# Patient Record
Sex: MALE | Race: WHITE | NOT HISPANIC OR LATINO | Employment: OTHER | URBAN - METROPOLITAN AREA
[De-identification: names, ages, dates, MRNs, and addresses within clinical notes are randomized per-mention and may not be internally consistent; named-entity substitution may affect disease eponyms.]

---

## 2017-02-24 ENCOUNTER — OFFICE VISIT (OUTPATIENT)
Dept: FAMILY MEDICINE | Facility: CLINIC | Age: 67
End: 2017-02-24

## 2017-02-24 VITALS
HEIGHT: 68 IN | WEIGHT: 258.6 LBS | DIASTOLIC BLOOD PRESSURE: 83 MMHG | BODY MASS INDEX: 39.19 KG/M2 | HEART RATE: 55 BPM | TEMPERATURE: 97.6 F | SYSTOLIC BLOOD PRESSURE: 159 MMHG | OXYGEN SATURATION: 99 %

## 2017-02-24 DIAGNOSIS — R03.0 ELEVATED BLOOD-PRESSURE READING WITHOUT DIAGNOSIS OF HYPERTENSION: ICD-10-CM

## 2017-02-24 DIAGNOSIS — E66.01 MORBID OBESITY DUE TO EXCESS CALORIES (H): ICD-10-CM

## 2017-02-24 DIAGNOSIS — E11.9 TYPE 2 DIABETES MELLITUS WITHOUT COMPLICATION, WITHOUT LONG-TERM CURRENT USE OF INSULIN (H): Primary | ICD-10-CM

## 2017-02-24 LAB
CREAT UR-MCNC: 73.8 MG/DL
HBA1C MFR BLD: 6.7 % (ref 4.1–5.7)
MICROALBUMIN UR-MCNC: <0.5 MG/DL (ref 0–1.99)
MICROALBUMIN/CREAT UR: NORMAL MG/G

## 2017-02-24 NOTE — MR AVS SNAPSHOT
"              After Visit Summary   2/24/2017    Alejandro Crow    MRN: [id number]           Patient Information     Date Of Birth          1950        Visit Information        Provider Department      2/24/2017 3:40 PM Deshawn Dowell MD [facility]        Today's Diagnoses     Type 2 diabetes mellitus without complication, without long-term current use of insulin (H)    -  1    Morbid obesity due to excess calories (H)        Elevated blood-pressure reading without diagnosis of hypertension           Follow-ups after your visit        Who to contact     Please call your clinic at [phone] to:    Ask questions about your health    Make or cancel appointments    Discuss your medicines    Learn about your test results    Speak to your doctor   If you have compliments or concerns about an experience at your clinic, or if you wish to file a complaint, please contact [facility] Patient Relations at [phone] or email us at [email or web address]         Additional Information About Your Visit        Care EveryWhere ID     This is your Care EveryWhere ID. This could be used by other organizations to access your [place] medical records  [id number]        Your Vitals Were     Pulse Temperature Height Pulse Oximetry BMI (Body Mass Index)       55 97.6  F (36.4  C) (Oral) 5' 7.75\" (172.1 cm) 99% 39.61 kg/m2        Blood Pressure from Last 3 Encounters:   02/24/17 159/83   10/06/16 127/76   07/19/16 128/89    Weight from Last 3 Encounters:   02/24/17 258 lb 9.6 oz (117.3 kg)   10/06/16 243 lb (110.2 kg)   07/19/16 241 lb 9.6 oz (109.6 kg)              We Performed the Following     Hemoglobin A1c ([facility])     Microalbumin Random Ur ([facility])        Primary Care Provider Office Phone # Fax #    Deshawn Dowell [phone]       [facility] [address] [place] [address]        Thank you!     Thank you for choosing " [facility]  for your care. Our goal is always to provide you with excellent care. Hearing back from our patients is one way we can continue to improve our services. Please take a few minutes to complete the written survey that you may receive in the mail after your visit with us. Thank you!             Your Updated Medication List - Protect others around you: Learn how to safely use, store and throw away your medicines at www.disposemymeds.org.          This list is accurate as of: 2/24/17 11:59 PM.  Always use your most recent med list.                   Brand Name Dispense Instructions for use    aspirin 81 MG EC tablet     90 tablet    Take 1 tablet (81 mg) by mouth daily       atorvastatin 80 MG tablet    LIPITOR    90 tablet    Take 1 tablet (80 mg) by mouth daily       blood glucose lancets standard    no brand specified    1 Box    Use to test blood sugar 4 times daily or as directed.       blood glucose monitoring lancets     1 Box    Use to test blood sugars 2-3 times daily or as directed.       blood glucose monitoring meter device kit    no brand specified    1 kit    Use to test blood sugar 4 times daily or as directed.       blood glucose monitoring test strip    no brand specified    2 Box    Use to test blood sugars 4 times daily or as directed       lisinopril 5 MG tablet    PRINIVIL/ZESTRIL    90 tablet    Take 1 tablet (5 mg) by mouth daily       metFORMIN 500 MG 24 hr tablet    GLUCOPHAGE-XR    360 tablet    Take 2 tablets (1,000 mg) by mouth 2 times daily (with meals)       OMEGA-3 FISH OIL PO          VITAMIN C PO      Take 2,000 mg by mouth       VITAMIN D3 PO      Take 4,000 Int'l Units by mouth daily

## 2017-02-24 NOTE — PROGRESS NOTES
"[facility]        Subjective   HPI: Alejandro Crow is a 66 year old  male with history of diabetes type 2, psoriasis, pulmonary embolism who presents as an established patient for the followin) Diabetes   Current Meds: metformin 1g BID   Fasting glucose: 100-135    Hypoglycemic symptoms: none    Exercise: 3-4 x week for 45 min   Concerns: none  2) psoriasis - seeing dermatology for this    ROS: constitutional, cardiovascular, respiratory, GI, , MSK systems reviewed and negative except as noted above.    PMH:  Patient Active Problem List   Diagnosis     Dyslipidemia     Psoriasis     Type 2 diabetes mellitus without complication, without long-term current use of insulin (H)     Skin lesion of face      Current Outpatient Prescriptions   Medication     atorvastatin (LIPITOR) 80 MG tablet     metFORMIN (GLUCOPHAGE-XR) 500 MG 24 hr tablet     blood glucose monitoring (ONE TOUCH DELICA) lancets     lisinopril (PRINIVIL,ZESTRIL) 5 MG tablet     blood glucose monitoring (NO BRAND SPECIFIED) meter device kit     blood glucose monitoring (NO BRAND SPECIFIED) test strip     blood glucose (NO BRAND SPECIFIED) lancets standard     Cholecalciferol (VITAMIN D3 PO)     Ascorbic Acid (VITAMIN C PO)     Omega-3 Fatty Acids (OMEGA-3 FISH OIL PO)     aspirin 81 MG EC tablet     No current facility-administered medications for this visit.       ALLERGIES: Review of patient's allergies indicates no known allergies.  Social: Denies tobacco, alcohol and illicit drug use. Works for [employer]. Caught two 17 in [employer] in the [employer] this year - very excited about it.          Objective   /83 (BP Location: Right arm, Patient Position: Chair, Cuff Size: Adult Large)  Pulse 55  Temp 97.6  F (36.4  C) (Oral)  Ht 5' 7.75\" (172.1 cm)  Wt 258 lb 9.6 oz (117.3 kg)  SpO2 99%  BMI 39.61 kg/m2 Body mass index is 39.61 kg/(m^2).  Gen: healthy, alert and no distress,  HEENT: no adenopathy, no asymmetry, masses, or " scars and thyroid normal to palpation  Pulm: lungs clear to auscultation - no rales, rhonchi or wheezes  CV: regular rate and rhythm,  and no murmur, click,  rub or gallop  Abd: soft, obese, nontender, without hepatosplenomegaly or masses  Back: nontender to palpation  Neuro: Normal strength and tone, sensory exam grossly normal, mentation appears intact and speech normal  Psych: mood and affect normal/bright    A1c 6.7         Assessment & Plan     1. Type 2 diabetes mellitus without complication, without long-term current use of insulin (H)  Remains well controlled on metformin ER 1000mg BID. On lisinopril 5, ASA 81. Goal A1c <7 but is new diagnosis so could make case for 6.5%. Continue current meds for now.  - Hemoglobin A1c ([facility])  - Microalbumin Random Ur ([facility])    2. Morbid obesity due to excess calories (H)  Discussed he is exercising more but that this doesn't give the liberty to eat more. Need to cut portions in half ideally.    3. Elevated blood-pressure reading without diagnosis of hypertension  BP has ran 120s-low 150s systolic, but increased today. He will obtain measurements at the  clinic 2-3x/per week and let us know. If elevated, would increase lisinopril.    F/u in 2 months for weight check.    Precepted today with: Paris Dowell MD

## 2017-02-26 PROBLEM — E66.01 MORBID OBESITY DUE TO EXCESS CALORIES (H): Status: ACTIVE | Noted: 2017-02-26

## 2017-02-26 PROBLEM — R03.0 ELEVATED BLOOD-PRESSURE READING WITHOUT DIAGNOSIS OF HYPERTENSION: Status: ACTIVE | Noted: 2017-02-26

## 2017-02-27 NOTE — PROGRESS NOTES
Preceptor Attestation:  Patient's case reviewed and discussed with Deshawn Dowell MD resident and I evaluated the patient. I agree with written assessment and plan of care.  Supervising Physician:  SARABJIT HAYDEN MD  [facility]

## 2017-04-18 DIAGNOSIS — E11.9 TYPE 2 DIABETES MELLITUS WITHOUT COMPLICATION, WITHOUT LONG-TERM CURRENT USE OF INSULIN (H): ICD-10-CM

## 2017-04-18 RX ORDER — METFORMIN HCL 500 MG
1000 TABLET, EXTENDED RELEASE 24 HR ORAL 2 TIMES DAILY WITH MEALS
Qty: 360 TABLET | Refills: 3 | Status: SHIPPED | OUTPATIENT
Start: 2017-04-18 | End: 2018-05-15

## 2017-08-08 DIAGNOSIS — E11.9 TYPE 2 DIABETES MELLITUS WITHOUT COMPLICATION (H): ICD-10-CM

## 2017-08-08 DIAGNOSIS — I10 BENIGN ESSENTIAL HYPERTENSION: Primary | ICD-10-CM

## 2017-08-11 RX ORDER — LISINOPRIL 5 MG/1
5 TABLET ORAL DAILY
Qty: 90 TABLET | Refills: 3 | Status: SHIPPED | OUTPATIENT
Start: 2017-08-11 | End: 2017-11-24

## 2017-11-10 ENCOUNTER — RECORDS - HEALTHEAST (OUTPATIENT)
Dept: ADMINISTRATIVE | Facility: OTHER | Age: 67
End: 2017-11-10

## 2017-11-10 ENCOUNTER — OFFICE VISIT (OUTPATIENT)
Dept: FAMILY MEDICINE | Facility: CLINIC | Age: 67
End: 2017-11-10

## 2017-11-10 VITALS
RESPIRATION RATE: 20 BRPM | DIASTOLIC BLOOD PRESSURE: 92 MMHG | HEART RATE: 59 BPM | HEIGHT: 68 IN | BODY MASS INDEX: 39.68 KG/M2 | OXYGEN SATURATION: 100 % | SYSTOLIC BLOOD PRESSURE: 162 MMHG | WEIGHT: 261.8 LBS | TEMPERATURE: 98.1 F

## 2017-11-10 DIAGNOSIS — M25.562 ACUTE PAIN OF LEFT KNEE: Primary | ICD-10-CM

## 2017-11-10 DIAGNOSIS — E11.9 TYPE 2 DIABETES MELLITUS WITHOUT COMPLICATION, WITHOUT LONG-TERM CURRENT USE OF INSULIN (H): ICD-10-CM

## 2017-11-10 DIAGNOSIS — R03.0 ELEVATED BLOOD-PRESSURE READING WITHOUT DIAGNOSIS OF HYPERTENSION: ICD-10-CM

## 2017-11-10 LAB
BUN SERPL-MCNC: 13 MG/DL (ref 7–30)
CALCIUM SERPL-MCNC: 9.4 MG/DL (ref 8.5–10.4)
CHLORIDE SERPLBLD-SCNC: 101 MMOL/L (ref 94–109)
CHOLEST SERPL-MCNC: 108 MG/DL
CHOLEST/HDLC SERPL: 3.6 RATIO
CO2 SERPL-SCNC: 28 MMOL/L (ref 20–32)
CREAT SERPL-MCNC: 1.1 MG/DL (ref 0.8–1.5)
EGFR CALCULATED (BLACK REFERENCE): 85.4 ML/MIN
EGFR CALCULATED (NON BLACK REFERENCE): 70.5 ML/MIN
GLUCOSE SERPL-MCNC: 134 MG/DL (ref 60–109)
HBA1C MFR BLD: 6.7 % (ref 4.1–5.7)
HDLC SERPL-MCNC: 30 MG/DL
LDLC SERPL CALC-MCNC: 51 MG/DL (ref 0–99)
POTASSIUM SERPL-SCNC: 3.8 MMOL/L (ref 3.4–5.3)
SODIUM SERPL-SCNC: 139 MMOL/L (ref 133–144)
TRIGL SERPL-MCNC: 138 MG/DL
VLDL-CHOLESTEROL: 28 MG/DL (ref 7–32)

## 2017-11-10 RX ORDER — MULTIPLE VITAMINS W/ MINERALS TAB 9MG-400MCG
1 TAB ORAL DAILY
Qty: 100 TABLET | Refills: 3 | COMMUNITY
Start: 2017-11-10

## 2017-11-10 NOTE — NURSING NOTE
11/9/2017 PCS Previsit Plan     DUE FOR:  Eye Exam Referral  TSH  Advance Directive  Fall Risk Assessment  Offer Flu Shot - Received   Labs: Lipids, A1C, Creatinine  PPSV 23  Foot Exam    Reagan Garcia, CMA

## 2017-11-10 NOTE — PATIENT INSTRUCTIONS
Referral for ( TEST )  :      MR Knee Left w/o Contrast  LOCATION/PLACE/Provider :    [facility]  DATE & TIME :     11- at 10:15am  PHONE :     [phone]  FAX :     [phone]  ADDITIONAL INFORMATION :       Appointment made by clinic staff/:    Alisha

## 2017-11-10 NOTE — MR AVS SNAPSHOT
"              After Visit Summary   11/10/2017    Alejandro Crow    MRN: [id number]           Patient Information     Date Of Birth          1950        Visit Information        Provider Department      11/10/2017 2:40 PM Deshawn Dowell MD [facility]        Today's Diagnoses     Acute pain of left knee    -  1    Type 2 diabetes mellitus without complication, without long-term current use of insulin (H)        Elevated blood-pressure reading without diagnosis of hypertension          Care Instructions    Referral for ( TEST )  :      MR Knee Left w/o Contrast  LOCATION/PLACE/Provider :    [facility]  DATE & TIME :     11- at 10:15am  PHONE :     [phone]  FAX :     [phone]  ADDITIONAL INFORMATION :       Appointment made by clinic staff/:    Alisha            Follow-ups after your visit        Your next 10 appointments already scheduled     Nov 24, 2017  9:20 AM CST   PHYSICAL with Deshawn Dowell MD   [facility]    [address] [place] [address]   [phone]              Who to contact     Please call your clinic at [phone] to:    Ask questions about your health    Make or cancel appointments    Discuss your medicines    Learn about your test results    Speak to your doctor   If you have compliments or concerns about an experience at your clinic, or if you wish to file a complaint, please contact [facility] Patient Relations at [phone] or email us at [email or web address]         Additional Information About Your Visit        Care EveryWhere ID     This is your Care EveryWhere ID. This could be used by other organizations to access your [place] medical records  [id number]        Your Vitals Were     Pulse Temperature Respirations Height Pulse Oximetry BMI (Body Mass Index)    59 98.1  F (36.7  C) (Oral) 20 5' 8\" (172.7 cm) 100% 39.81 kg/m2       Blood Pressure from " Last 3 Encounters:   11/10/17 (!) 162/92   02/24/17 159/83   10/06/16 127/76    Weight from Last 3 Encounters:   11/10/17 261 lb 12.8 oz (118.8 kg)   02/24/17 258 lb 9.6 oz (117.3 kg)   10/06/16 243 lb (110.2 kg)              We Performed the Following     Basic Metabolic Panel ([facility])     Hemoglobin A1c ([facility])     Lipid Panel ([facility])          Today's Medication Changes          These changes are accurate as of: 11/10/17 11:59 PM.  If you have any questions, ask your nurse or doctor.               Stop taking these medicines if you haven't already. Please contact your care team if you have questions.     blood glucose monitoring lancets   Stopped by:  Deshawn Dowell MD           blood glucose monitoring meter device kit   Commonly known as:  no brand specified   Stopped by:  Deshawn Dowell MD           blood glucose monitoring test strip   Commonly known as:  no brand specified   Stopped by:  Deshawn Dowell MD                    Primary Care Provider Office Phone # Fax #    Deshawn Dowell [phone]       [facility] [address] [place] [address]        Equal Access to Services     LUIS ALLEN AH: Hadii nakia ku hadasho Soomaali, waaxda luqadaha, qaybta kaalmada adeegyada, waxay frederickin haypopeyen reji hemphill. So [facility] [phone].    ATENCIÓN: Si habla español, tiene a garcia disposición servicios gratuitos de asistencia lingüística. Llame al [phone].    We comply with applicable federal civil rights laws and [place] laws. We do not discriminate on the basis of race, color, national origin, age, disability, sex, sexual orientation, or gender identity.            Thank you!     Thank you for choosing [facility]  for your care. Our goal is always to provide you with excellent care. Hearing back from our patients is one way we can continue to improve our services. Please take a few minutes to complete the written survey that you may receive  in the mail after your visit with us. Thank you!             Your Updated Medication List - Protect others around you: Learn how to safely use, store and throw away your medicines at www.disposemymeds.org.          This list is accurate as of: 11/10/17 11:59 PM.  Always use your most recent med list.                   Brand Name Dispense Instructions for use Diagnosis    aspirin 81 MG EC tablet     90 tablet    Take 1 tablet (81 mg) by mouth daily    Diabetes mellitus, type 2 (H)       atorvastatin 80 MG tablet    LIPITOR    90 tablet    Take 1 tablet (80 mg) by mouth daily    Dyslipidemia       blood glucose lancets standard    no brand specified    1 Box    Use to test blood sugar 4 times daily or as directed.    Type 2 diabetes mellitus with hyperglycemia (H)       lisinopril 5 MG tablet    PRINIVIL/ZESTRIL    90 tablet    Take 1 tablet (5 mg) by mouth daily    Benign essential hypertension       metFORMIN 500 MG 24 hr tablet    GLUCOPHAGE-XR    360 tablet    Take 2 tablets (1,000 mg) by mouth 2 times daily (with meals)    Type 2 diabetes mellitus without complication, without long-term current use of insulin (H)       Multi-vitamin Tabs tablet     100 tablet    Take 1 tablet by mouth        OMEGA-3 FISH OIL PO           VITAMIN C PO      Take 2,000 mg by mouth        VITAMIN D3 PO      Take 4,000 Int'l Units by mouth daily

## 2017-11-10 NOTE — PROGRESS NOTES
"[facility]        Subjective     CC: Patient presents with:  Knee Pain: Left knee, turned a bit and the next day knee was sore.  Injury since Sep 2017, and pain is still present when flexing foot straight up and walking.   Medication Reconciliation: Completed, please add multivitamin per patient.     HPI: Alejandro Crow is a 67 year old male with history of type 2 diabetes.    1) Knee: Was fishing early September when he slipped on a rock, twisted his R foot, fell, hurt his knee, having quite a bit of pain in his right medial lower knee. Still having sharp pain to touch. No pain when sleeping or standing. Able to ride exercise bicycle. Still not able to walk normally. Had a very hard time cutting the grass last week.   Leg swelling up at times.     Hasn't been able to golf or fish since then.    ROS: constitutional, cardiovascular, respiratory, GI, , MSK systems reviewed and negative except as noted above.    Social:  Works at [employer] in [place]. . One grandchild, about 2 years old.           Objective   BP (!) 162/92  Pulse 59  Temp 98.1  F (36.7  C) (Oral)  Resp 20  Ht 5' 8\" (172.7 cm)  Wt 261 lb 12.8 oz (118.8 kg)  SpO2 100%  BMI 39.81 kg/m2 Body mass index is 39.81 kg/(m^2).  Gen: healthy, alert and no distress,  Extrem: R knee w/o any erythema or effusion. lachmans test negative with definitive endpoint. Unable to reproduce pain with palpation anywhere on the knee or with passive motion of the knee joint or with eversion/inversion of the foot. Walks with a significant limp though; pain seems to be worse with weightbearing with foot slightly dorsiflexed.          Assessment & Plan     1. Acute pain of left knee  Given no improvement despite 2 months of conservative treatment at home and continued limp, believe soft tissue imaging is warranted. Patient would be interested in surgery.   - MR Knee Left w/o Contrast; Future    2. Type 2 diabetes mellitus without complication, without " long-term current use of insulin (H)  Will check labs today in preparation for his upcoming physical in 2 weeks.   - Basic Metabolic Panel ([facility])  - Lipid Panel ([facility])  - Hemoglobin A1c ([facility])    BP elevated today.  Was also up at last visit in Feb. 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 He will bring these in and also try to take his BP at work a few more times prior to his physical. His BP has more often than not been higher than 140 so would lead towards increasing his lisinopril (currently just at 5mg).    Precepted today with: DO Deshawn Fuentes MD    -------  PMH:  Patient Active Problem List   Diagnosis     Dyslipidemia     Psoriasis     Type 2 diabetes mellitus without complication, without long-term current use of insulin (H)     Skin lesion of face     Morbid obesity due to excess calories (H)     Elevated blood-pressure reading without diagnosis of hypertension      Current Outpatient Prescriptions   Medication     lisinopril (PRINIVIL/ZESTRIL) 5 MG tablet     metFORMIN (GLUCOPHAGE-XR) 500 MG 24 hr tablet     atorvastatin (LIPITOR) 80 MG tablet     Cholecalciferol (VITAMIN D3 PO)     Ascorbic Acid (VITAMIN C PO)     Omega-3 Fatty Acids (OMEGA-3 FISH OIL PO)     aspirin 81 MG EC tablet     blood glucose monitoring (ONE TOUCH DELICA) lancets     blood glucose monitoring (NO BRAND SPECIFIED) meter device kit     blood glucose monitoring (NO BRAND SPECIFIED) test strip     blood glucose (NO BRAND SPECIFIED) lancets standard     No current facility-administered medications for this visit.       ALLERGIES: Review of patient's allergies indicates no known allergies.

## 2017-11-13 ENCOUNTER — HOSPITAL ENCOUNTER (OUTPATIENT)
Dept: MRI IMAGING | Facility: HOSPITAL | Age: 67
Discharge: HOME | End: 2017-11-13
Attending: HOSPITALIST

## 2017-11-13 DIAGNOSIS — M25.562 ACUTE PAIN OF LEFT KNEE: ICD-10-CM

## 2017-11-14 NOTE — PROGRESS NOTES
Preceptor Attestation:  Patient's case reviewed and discussed with Deshawn Dowell MD Patient seen and discussed with the resident. I agree with assessment and plan of care.  Supervising Physician:  Karol Barraza DO  [facility]

## 2017-11-24 ENCOUNTER — OFFICE VISIT (OUTPATIENT)
Dept: FAMILY MEDICINE | Facility: CLINIC | Age: 67
End: 2017-11-24

## 2017-11-24 VITALS
TEMPERATURE: 97.3 F | BODY MASS INDEX: 38.95 KG/M2 | HEIGHT: 68 IN | SYSTOLIC BLOOD PRESSURE: 174 MMHG | DIASTOLIC BLOOD PRESSURE: 83 MMHG | RESPIRATION RATE: 16 BRPM | OXYGEN SATURATION: 99 % | WEIGHT: 257 LBS | HEART RATE: 69 BPM

## 2017-11-24 DIAGNOSIS — I10 BENIGN ESSENTIAL HYPERTENSION: ICD-10-CM

## 2017-11-24 DIAGNOSIS — R60.0 LOWER EXTREMITY EDEMA: ICD-10-CM

## 2017-11-24 DIAGNOSIS — Z87.891 PERSONAL HISTORY OF TOBACCO USE, PRESENTING HAZARDS TO HEALTH: ICD-10-CM

## 2017-11-24 DIAGNOSIS — E11.9 TYPE 2 DIABETES MELLITUS WITHOUT COMPLICATION, WITHOUT LONG-TERM CURRENT USE OF INSULIN (H): ICD-10-CM

## 2017-11-24 DIAGNOSIS — S83.232A COMPLEX TEAR OF MEDIAL MENISCUS OF LEFT KNEE AS CURRENT INJURY, INITIAL ENCOUNTER: ICD-10-CM

## 2017-11-24 DIAGNOSIS — Z00.00 ANNUAL PHYSICAL EXAM: Primary | ICD-10-CM

## 2017-11-24 RX ORDER — LISINOPRIL 40 MG/1
40 TABLET ORAL DAILY
Qty: 30 TABLET | Refills: 0 | Status: SHIPPED | OUTPATIENT
Start: 2017-11-24 | End: 2017-12-21

## 2017-11-24 NOTE — MR AVS SNAPSHOT
After Visit Summary   11/24/2017    Alejandro Crow    MRN: [id number]           Patient Information     Date Of Birth          1950        Visit Information        Provider Department      11/24/2017 9:20 AM Deshawn Dowell MD [facility]        Today's Diagnoses     Complex tear of medial meniscus of left knee as current injury, initial encounter    -  1    Personal history of tobacco use, presenting hazards to health        Annual physical exam        Lower extremity edema        Benign essential hypertension           Follow-ups after your visit        Additional Services     ORTHOPEDICS ADULT REFERRAL       Your provider has referred you to: [facility] - Dr Lui Padgett -meniscal injury. MRI at [facility].    Please be aware that coverage of these services is subject to the terms and limitations of your health insurance plan.  Call member services at your health plan with any benefit or coverage questions.      Please bring the following to your appointment:    >>   Any x-rays, CTs or MRIs which have been performed.  Contact the facility where they were done to arrange for  prior to your scheduled appointment.    >>   List of current medications   >>   This referral request   >>   Any documents/labs given to you for this referral                  Future tests that were ordered for you today     Open Future Orders        Priority Expected Expires Ordered    CT Chest Lung Cancer Scrn Low Dose wo Routine  11/24/2018 11/24/2017            Who to contact     Please call your clinic at [phone] to:    Ask questions about your health    Make or cancel appointments    Discuss your medicines    Learn about your test results    Speak to your doctor   If you have compliments or concerns about an experience at your clinic, or if you wish to file a complaint, please contact [facility] Patient Relations at [phone] or email us at  [email or web address]         Additional Information About Your Visit        Care EveryWhere ID     This is your Care EveryWhere ID. This could be used by other organizations to access your [place] medical records  [id number]        Your Vitals Were     Pulse Temperature Respirations Height Pulse Oximetry BMI (Body Mass Index)    69 97.3  F (36.3  C) (Oral) 16 5' 8\" (172.7 cm) 99% 39.08 kg/m2       Blood Pressure from Last 3 Encounters:   11/24/17 174/83   11/10/17 (!) 162/92   02/24/17 159/83    Weight from Last 3 Encounters:   11/24/17 257 lb (116.6 kg)   11/10/17 261 lb 12.8 oz (118.8 kg)   02/24/17 258 lb 9.6 oz (117.3 kg)              We Performed the Following     ORTHOPEDICS ADULT REFERRAL          Today's Medication Changes          These changes are accurate as of: 11/24/17 10:50 AM.  If you have any questions, ask your nurse or doctor.               Start taking these medicines.        Dose/Directions    order for DME   Used for:  Lower extremity edema   Started by:  Deshawn Dowell MD        Dispense 2 pairs of bilateral ready to wear 20-30mmHg compression stockings to be worn at all times while awake   Quantity:  2 Units   Refills:  0         These medicines have changed or have updated prescriptions.        Dose/Directions    lisinopril 40 MG tablet   Commonly known as:  PRINIVIL/ZESTRIL   This may have changed:    - medication strength  - how much to take   Used for:  Benign essential hypertension   Changed by:  Deshawn Dowell MD        Dose:  40 mg   Take 1 tablet (40 mg) by mouth daily   Quantity:  30 tablet   Refills:  0            Where to get your medicines      These medications were sent to [facility] - [place], [place] - [address]  [address], [place] [address]     Phone:  [phone]     lisinopril 40 MG tablet         Some of these will need a paper prescription and others can be bought over the counter.  Ask your nurse if you " have questions.     Bring a paper prescription for each of these medications     order for DME                Primary Care Provider Office Phone # Fax #    Deshawn Dowell [phone]       [facility] [address] [place] [address]        Equal Access to Services     LUIS ALLEN : Hadii aad ku hadasho Soomaali, waaxda luqadaha, qaybta kaalmada adeegyada, waxay idiin hayaan reji fernandezkamillajluis lajuan hemphill. So [facility] [phone].    ATENCIÓN: Si habla español, tiene a garcia disposición servicios gratuitos de asistencia lingüística. Llame al [phone].    We comply with applicable federal civil rights laws and [place] laws. We do not discriminate on the basis of race, color, national origin, age, disability, sex, sexual orientation, or gender identity.            Thank you!     Thank you for choosing [facility]  for your care. Our goal is always to provide you with excellent care. Hearing back from our patients is one way we can continue to improve our services. Please take a few minutes to complete the written survey that you may receive in the mail after your visit with us. Thank you!             Your Updated Medication List - Protect others around you: Learn how to safely use, store and throw away your medicines at www.disposemymeds.org.          This list is accurate as of: 11/24/17 10:50 AM.  Always use your most recent med list.                   Brand Name Dispense Instructions for use Diagnosis    aspirin 81 MG EC tablet     90 tablet    Take 1 tablet (81 mg) by mouth daily    Diabetes mellitus, type 2 (H)       atorvastatin 80 MG tablet    LIPITOR    90 tablet    Take 1 tablet (80 mg) by mouth daily    Dyslipidemia       blood glucose lancets standard    no brand specified    1 Box    Use to test blood sugar 4 times daily or as directed.    Type 2 diabetes mellitus with hyperglycemia (H)       lisinopril 40 MG tablet    PRINIVIL/ZESTRIL    30 tablet    Take 1  tablet (40 mg) by mouth daily    Benign essential hypertension       metFORMIN 500 MG 24 hr tablet    GLUCOPHAGE-XR    360 tablet    Take 2 tablets (1,000 mg) by mouth 2 times daily (with meals)    Type 2 diabetes mellitus without complication, without long-term current use of insulin (H)       Multi-vitamin Tabs tablet     100 tablet    Take 1 tablet by mouth        OMEGA-3 FISH OIL PO           order for DME     2 Units    Dispense 2 pairs of bilateral ready to wear 20-30mmHg compression stockings to be worn at all times while awake    Lower extremity edema       VITAMIN C PO      Take 2,000 mg by mouth        VITAMIN D3 PO      Take 4,000 Int'l Units by mouth daily

## 2017-11-24 NOTE — PROGRESS NOTES
Male Physical Note         HPI       Concerns today:   Continues to have pain and weakness in left knee. Would like to review MRI results.            Review of Systems:   CONSTITUTIONAL: no fatigue, no unexpected change in weight  SKIN: no worrisome rashes, moles, or lesions  EYES: no acute vision problems or changes, sees optometrist regularily   ENT: no ear, mouth or throat problems, sees dentist regularily  RESP: no significant cough, no shortness of breath  BREAST: no masses, tenderness or discharge  CV: no chest pain or palpitations, no new or worsening peripheral edema  GI: no nausea, vomiting, constipation, or diarrhea   male: negative for dysuria, hematuria, decreased urinary stream, erectile dysfunction, urethral discharge, or nocturia  MS: +knee giving out  NEURO: no weakness, dizziness, syncope, or headaches  PSYCHIATRIC: no changes in mood or affect, see PHQ-2    Patient Active Problem List   Diagnosis     Dyslipidemia     Psoriasis     Type 2 diabetes mellitus without complication, without long-term current use of insulin (H)     Skin lesion of face     Morbid obesity due to excess calories (H)     Elevated blood-pressure reading without diagnosis of hypertension     Active problem list reviewed and updated.    Past Medical History:   Diagnosis Date     JUDITH (acute kidney injury) (H)     required temporary dialysis, now resolved     Diabetes mellitus, type 2 (H) 2/22/2013     Knee pain      Pulmonary embolism (H)     ? [facility], early 2000s?     Past medical history reviewed and updated.     Past Surgical History:   Procedure Laterality Date     CATARACT IOL, RT/LT      Cataract IOL RT/LT     Past surgical history reviewed and updated.    Family History     Problem (# of Occurrences) Relation (Name,Age of Onset)    Breast Cancer (1) Mother    Cancer - colorectal (1) Father        Family history reviewed and updated.  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  2 grown children.  Wife retired last year.        Education completed=14     Social history reviewed and updated.    Has anyone hurt you physically, for example by pushing, hitting, slapping or kicking you or forcing you to have sex? Denies  Do you feel threatened or controlled by a partner, ex-partner or anyone in your life? Denies    RISK BEHAVIORS AND HEALTHY HABITS:  Tobacco Use/Smoking: Smoked from age [age], usually about 1 ppd,   Illicit Drug Use: None  Do you use alcohol? rarely  Diet (5-7 servings of fruits/veg daily): Yes   Exercise (30 min accumulated most days): trying to bike more but other activities limited by knee  Dental Care: Yes   Calcium 1500 mg/d:  Yes  Seat Belt Use: Yes     Immunization History   Administered Date(s) Administered     Influenza (IIV3) PF 10/10/2017     Influenza Vaccine IM 3yrs+ 4 Valent IIV4 02/05/2015     Pneumococcal (PCV 13) 10/06/2016     Tdap (Adacel,Boostrix) 04/27/2010            Physical Exam:   /83  Pulse 69  Temp 97.3  F (36.3  C) (Oral)  Resp 16  Ht 5' 8\" (172.7 cm)  Wt 257 lb (116.6 kg)  SpO2 99%  BMI 39.08 kg/m2  GENERAL: healthy, alert and in no distress  EYES: Eyes grossly normal to inspection, extraocular movements intact, PERRL  HENT: ear canals clear with pearly grey TMs without bulging or erythema, moist mucous membranes with good dentition, no ulcers or lesions noted in oropharynx  NECK: no cervical adenopathy or other masses, thyroid without enlargement or nodules  RESP: lungs clear to auscultation - no wheezing or crackles, good air movement throughout  CV: regular rates and rhythm, normal S1 and S2 without murmur, click or rub  ABDOMEN: soft, non-tender, no hepatosplenomegaly, normal active bowel sounds  MS: no gross extremity deformities noted. B/l LE pitting edema up to mid calf  SKIN: no suspicious " lesions or rashes  NEURO: moving all extremities equally and without difficulty, grossly normal sensory exam, intact mentation, clear coherent speech, symmetric reflexes . Foot: Skin intact. pinprick sensation & vibration perception intact. ankle reflexes normal. Dorsalis pedis and posterior tibial pulses intact bilaterally.  BACK: no CVA tenderness, no paralumbar tenderness  : normal male genitalia without lesions or urethral discharge, no hernia  RECTAL: normal sphincter tone, no rectal masses, prostate of normal size, smooth, nontender without nodules or masses  PSYCH: Alert and oriented times 3, able to articulate logical thoughts, able to abstract reason, no tangential thoughts, no hallucinations or delusions, normal appropriate affect    Assessment and Plan      1. Annual physical exam  HIV screening:  Declined.   Colon CA Screening (>50-75 ):  Due 3/2020  Discussed risks, benefits, and current recommendations for PSA Screening. Patient declines testing at this time.  Hepatitis C Virus Screening (if born between 1945 and 1965):  Completed 10/2016. Negative.   Discussed TDAP, influenza, and pneumovax vaccinations today. Up to date asides from pneumovax - will complete at next visit.     2. Complex tear of medial meniscus of left knee as current injury, initial encounter  Reviewed MRI results. May need surgery - referred to orthopedics. Does have some bony edema but has been weight bearing for >2 months now so do not think limiting this would make much of a difference. Advised to limit extra exercise / weight bearing activities.   - ORTHOPEDICS ADULT REFERRAL    3. Personal history of tobacco use, presenting hazards to health  Decided to proceed with screening today given his use in the past and having quit <15 years ago. Had AAA screening last year.   - CT Chest Lung Cancer Scrn Low Dose wo; Future    4. Lower extremity edema  Per patient this is all dependent and accumulates during the day. Will try  compression to see if this helps.   - order for DME; Dispense 2 pairs of bilateral ready to wear 20-30mmHg compression stockings to be worn at all times while awake  Dispense: 2 Units; Refill: 0    5. Benign essential hypertension  Previously just on lisinopril 5 but BP has been consistently elevated here last several visits and also at [facility] when he donates blood. Will increase to 40mg today   - lisinopril (PRINIVIL/ZESTRIL) 40 MG tablet; Take 1 tablet (40 mg) by mouth daily  Dispense: 30 tablet; Refill: 0    6. Diabetes: remains well controlled on metformin. Taking ASA and high intensity statin.     F/u 1-2 weeks for BP check, BMP, and pneumovax.    Deshawn Dowell MD R3    Precepted with: Karol Barraza DO

## 2017-11-28 NOTE — PATIENT INSTRUCTIONS
Referral for ( TEST )  :      Orthopedics   LOCATION/PLACE/Provider :    [facility] [place]  DATE & TIME :     11- at 8:00am  PHONE :     [phone]  FAX :     [phone]  ADDITIONAL INFORMATION :     Pt will need to bring a CD copy of  MRI of knee to appointment  Appointment made by clinic staff/:    Alisha

## 2017-11-29 ENCOUNTER — OFFICE VISIT (OUTPATIENT)
Dept: FAMILY MEDICINE | Facility: CLINIC | Age: 67
End: 2017-11-29

## 2017-11-29 VITALS
DIASTOLIC BLOOD PRESSURE: 86 MMHG | WEIGHT: 256.6 LBS | SYSTOLIC BLOOD PRESSURE: 138 MMHG | TEMPERATURE: 97.9 F | RESPIRATION RATE: 20 BRPM | BODY MASS INDEX: 40.27 KG/M2 | OXYGEN SATURATION: 99 % | HEIGHT: 67 IN | HEART RATE: 60 BPM

## 2017-11-29 DIAGNOSIS — R60.0 LOWER EXTREMITY EDEMA: ICD-10-CM

## 2017-11-29 DIAGNOSIS — Z87.891 PERSONAL HISTORY OF TOBACCO USE, PRESENTING HAZARDS TO HEALTH: ICD-10-CM

## 2017-11-29 DIAGNOSIS — Z23 IMMUNIZATION DUE: ICD-10-CM

## 2017-11-29 DIAGNOSIS — I10 BENIGN ESSENTIAL HYPERTENSION: Primary | ICD-10-CM

## 2017-11-29 LAB
BUN SERPL-MCNC: 16 MG/DL (ref 7–30)
CALCIUM SERPL-MCNC: 9.8 MG/DL (ref 8.5–10.4)
CHLORIDE SERPLBLD-SCNC: 102 MMOL/L (ref 94–109)
CO2 SERPL-SCNC: 28 MMOL/L (ref 20–32)
CREAT SERPL-MCNC: 1.1 MG/DL (ref 0.8–1.5)
EGFR CALCULATED (BLACK REFERENCE): 85.4 ML/MIN
EGFR CALCULATED (NON BLACK REFERENCE): 70.5 ML/MIN
GLUCOSE SERPL-MCNC: 90 MG/DL (ref 60–109)
HEMOGLOBIN: 13.1 G/DL (ref 13.3–17.7)
POTASSIUM SERPL-SCNC: 4.7 MMOL/L (ref 3.4–5.3)
SODIUM SERPL-SCNC: 142 MMOL/L (ref 133–144)

## 2017-11-29 NOTE — PROGRESS NOTES
"[facility]        Subjective     CC: Patient presents with:  RECHECK: f/u: hypertension and Rx  Medication Reconciliation: Not completed, patient did not bring Rx      HPI: Alejandro Crow is a 67 year old male with history of JUDITH (requiring dialysis), type 2 diabetes.    1) knee: has appt w/ortho tomorrow. Last week rode bicycle and then did stairmaster, then developed pain.     2) Swelling: has been paying more attention to his legs after our conversation last week. He says that he has no swelling in morning and that it is accumulating during the daytime. Yet to  his compression stockings. No dyspnea. No chest pain.     3) Wondering about his CT scan for lung cancer screening - we ordered this at his last visit but still hasn't heard anything.     4) HTN: has been taking lisinopril at increased dose.     ROS: constitutional, cardiovascular, respiratory, GI, , MSK systems reviewed and negative except as noted above.    Social:  Works at [employer]. Ex Smoker.           Objective   /86  Pulse 60  Temp 97.9  F (36.6  C) (Oral)  Resp 20  Ht 5' 7\" (170.2 cm)  Wt 256 lb 9.6 oz (116.4 kg)  SpO2 99%  BMI 40.19 kg/m2 Body mass index is 40.19 kg/(m^2).  Gen: healthy, alert and no distress,  HEENT: no adenopathy, no asymmetry, masses, or scars  Pulm: lungs clear to auscultation - no rales, rhonchi or wheezes  CV: regular rate and rhythm,  and no murmur, click,  rub or gallop  Abd: soft, nontender, without hepatosplenomegaly or masses  Back: nontender to palpation  Extrem: trace edema b/l up to lower shin    K 4.7         Assessment & Plan     1. Benign essential hypertension  Stable K. Continue lisinopril at 40mg qd  - Basic Metabolic Panel ([facility])  - Hemoglobin (HGB) ([facility])    2. Personal history of tobacco use, presenting hazards to health  Will ask Alisha to work on scheduling CT scan as appears to have been routed to U and not actually scheduled.     3. Lower extremity edema  Encouraged " him to wear stockings. If not improving with stockings and elevation will need to explore a bit more.     4. Immunization due  - ADMIN VACCINE, INITIAL  - Pneumococcal vaccine 23 valent PPSV23  (Pneumovax) [[id number]]    F/U with Ortho tomorrow, then here as needed.    Precepted today with: MD Deshawn Marshall MD    -------  PMH:  Patient Active Problem List   Diagnosis     Dyslipidemia     Psoriasis     Type 2 diabetes mellitus without complication, without long-term current use of insulin (H)     Skin lesion of face     Morbid obesity due to excess calories (H)     Elevated blood-pressure reading without diagnosis of hypertension      Current Outpatient Prescriptions   Medication     order for DME     lisinopril (PRINIVIL/ZESTRIL) 40 MG tablet     multivitamin, therapeutic with minerals (MULTI-VITAMIN) TABS tablet     metFORMIN (GLUCOPHAGE-XR) 500 MG 24 hr tablet     atorvastatin (LIPITOR) 80 MG tablet     blood glucose (NO BRAND SPECIFIED) lancets standard     Cholecalciferol (VITAMIN D3 PO)     Ascorbic Acid (VITAMIN C PO)     Omega-3 Fatty Acids (OMEGA-3 FISH OIL PO)     aspirin 81 MG EC tablet     No current facility-administered medications for this visit.       ALLERGIES: Review of patient's allergies indicates no known allergies.

## 2017-11-29 NOTE — MR AVS SNAPSHOT
"              After Visit Summary   11/29/2017    Alejandro Crow    MRN: [id number]           Patient Information     Date Of Birth          1950        Visit Information        Provider Department      11/29/2017 2:40 PM Deshawn Dowell MD [facility]        Today's Diagnoses     Benign essential hypertension    -  1    Personal history of tobacco use, presenting hazards to health        Lower extremity edema        Immunization due           Follow-ups after your visit        Who to contact     Please call your clinic at [phone] to:    Ask questions about your health    Make or cancel appointments    Discuss your medicines    Learn about your test results    Speak to your doctor   If you have compliments or concerns about an experience at your clinic, or if you wish to file a complaint, please contact [facility] Patient Relations at [phone] or email us at [email or web address]         Additional Information About Your Visit        Care EveryWhere ID     This is your Care EveryWhere ID. This could be used by other organizations to access your [facility] medical records  [id number]        Your Vitals Were     Pulse Temperature Respirations Height Pulse Oximetry BMI (Body Mass Index)    60 97.9  F (36.6  C) (Oral) 20 5' 7\" (170.2 cm) 99% 40.19 kg/m2       Blood Pressure from Last 3 Encounters:   11/29/17 138/86   11/24/17 174/83   11/10/17 (!) 162/92    Weight from Last 3 Encounters:   11/29/17 256 lb 9.6 oz (116.4 kg)   11/24/17 257 lb (116.6 kg)   11/10/17 261 lb 12.8 oz (118.8 kg)              We Performed the Following     ADMIN VACCINE, INITIAL     Basic Metabolic Panel ([facility])     Hemoglobin (HGB) ([facility])     Pneumococcal vaccine 23 valent PPSV23  (Pneumovax) [[id number]]        Primary Care Provider Office Phone # Fax #    Deshawn Dowell [phone]       [facility] [address] [place] [address]      "   Equal Access to Services     [facility] Center[facility] : Hadii nakia starr radhadorian Victor Manuelali, waaxda luqadaha, qaybta kaalmavasile thibodeaux, julee hemphill. So [facility] [phone].    ATENCIÓN: Si habla español, tiene a garcia disposición servicios gratuitos de asistencia lingüística. Llame al [phone].    We comply with applicable federal civil rights laws and [place] laws. We do not discriminate on the basis of race, color, national origin, age, disability, sex, sexual orientation, or gender identity.            Thank you!     Thank you for choosing [facility]  for your care. Our goal is always to provide you with excellent care. Hearing back from our patients is one way we can continue to improve our services. Please take a few minutes to complete the written survey that you may receive in the mail after your visit with us. Thank you!             Your Updated Medication List - Protect others around you: Learn how to safely use, store and throw away your medicines at www.disposemymeds.org.          This list is accurate as of: 11/29/17 11:59 PM.  Always use your most recent med list.                   Brand Name Dispense Instructions for use Diagnosis    aspirin 81 MG EC tablet     90 tablet    Take 1 tablet (81 mg) by mouth daily    Diabetes mellitus, type 2 (H)       atorvastatin 80 MG tablet    LIPITOR    90 tablet    Take 1 tablet (80 mg) by mouth daily    Dyslipidemia       blood glucose lancets standard    no brand specified    1 Box    Use to test blood sugar 4 times daily or as directed.    Type 2 diabetes mellitus with hyperglycemia (H)       lisinopril 40 MG tablet    PRINIVIL/ZESTRIL    30 tablet    Take 1 tablet (40 mg) by mouth daily    Benign essential hypertension       metFORMIN 500 MG 24 hr tablet    GLUCOPHAGE-XR    360 tablet    Take 2 tablets (1,000 mg) by mouth 2 times daily (with meals)    Type 2 diabetes mellitus without complication, without long-term current use of  insulin (H)       Multi-vitamin Tabs tablet     100 tablet    Take 1 tablet by mouth        OMEGA-3 FISH OIL PO           order for DME     2 Units    Dispense 2 pairs of bilateral ready to wear 20-30mmHg compression stockings to be worn at all times while awake    Lower extremity edema       VITAMIN C PO      Take 2,000 mg by mouth        VITAMIN D3 PO      Take 4,000 Int'l Units by mouth daily

## 2017-11-29 NOTE — PROGRESS NOTES
Preceptor Attestation:  Patient's case reviewed and discussed with Deshawn Dowell MD Patient seen and discussed with the resident.. I agree with assessment and plan of care.  Supervising Physician:  Madi Pacheco MD  [facility]

## 2017-12-21 DIAGNOSIS — I10 BENIGN ESSENTIAL HYPERTENSION: ICD-10-CM

## 2017-12-21 RX ORDER — LISINOPRIL 40 MG/1
40 TABLET ORAL DAILY
Qty: 90 TABLET | Refills: 3 | Status: SHIPPED | OUTPATIENT
Start: 2017-12-21 | End: 2021-09-23

## 2018-01-17 ENCOUNTER — OFFICE VISIT (OUTPATIENT)
Dept: FAMILY MEDICINE | Facility: CLINIC | Age: 68
End: 2018-01-17
Payer: COMMERCIAL

## 2018-01-17 VITALS
BODY MASS INDEX: 38.65 KG/M2 | TEMPERATURE: 98.1 F | DIASTOLIC BLOOD PRESSURE: 82 MMHG | WEIGHT: 255 LBS | HEART RATE: 69 BPM | OXYGEN SATURATION: 97 % | RESPIRATION RATE: 18 BRPM | HEIGHT: 68 IN | SYSTOLIC BLOOD PRESSURE: 148 MMHG

## 2018-01-17 DIAGNOSIS — I10 BENIGN ESSENTIAL HYPERTENSION: ICD-10-CM

## 2018-01-17 DIAGNOSIS — E11.9 TYPE 2 DIABETES MELLITUS WITHOUT COMPLICATION, WITHOUT LONG-TERM CURRENT USE OF INSULIN (H): ICD-10-CM

## 2018-01-17 DIAGNOSIS — R60.0 LOWER EXTREMITY EDEMA: ICD-10-CM

## 2018-01-17 DIAGNOSIS — Z01.818 PRE-OP EXAM: Primary | ICD-10-CM

## 2018-01-17 DIAGNOSIS — M17.12 PRIMARY OSTEOARTHRITIS OF LEFT KNEE: ICD-10-CM

## 2018-01-17 PROBLEM — R03.0 ELEVATED BLOOD-PRESSURE READING WITHOUT DIAGNOSIS OF HYPERTENSION: Status: RESOLVED | Noted: 2017-02-26 | Resolved: 2018-01-17

## 2018-01-17 PROBLEM — Z86.711 HISTORY OF PULMONARY EMBOLISM: Status: ACTIVE | Noted: 2018-01-17

## 2018-01-17 PROBLEM — N18.2 CHRONIC KIDNEY DISEASE, STAGE 2 (MILD): Status: ACTIVE | Noted: 2018-01-17

## 2018-01-17 LAB
BUN SERPL-MCNC: 13 MG/DL (ref 7–30)
CALCIUM SERPL-MCNC: 9.3 MG/DL (ref 8.5–10.4)
CHLORIDE SERPLBLD-SCNC: 102 MMOL/L (ref 94–109)
CO2 SERPL-SCNC: 28 MMOL/L (ref 20–32)
CREAT SERPL-MCNC: 1.2 MG/DL (ref 0.8–1.5)
EGFR CALCULATED (BLACK REFERENCE): 77.2 ML/MIN
EGFR CALCULATED (NON BLACK REFERENCE): 63.8 ML/MIN
GLUCOSE SERPL-MCNC: 123 MG/DL (ref 60–109)
HEMOGLOBIN: 12.6 G/DL (ref 13.3–17.7)
POTASSIUM SERPL-SCNC: 3.8 MMOL/L (ref 3.4–5.3)
SODIUM SERPL-SCNC: 143 MMOL/L (ref 133–144)

## 2018-01-17 NOTE — PROGRESS NOTES
[facility] [address] [place] [address]  Phone: [phone]  Fax: [phone]    1/17/2018    Adult PRE-OP Evaluation:    Alejandro Crow, 1950 presents for pre-operative evaluation and assessment as requested by Dr. Diaz, prior to undergoing surgery/procedure for treatment of knee pain due to OA knee and meniscal tear.     Proposed procedure: Left total knee replacement    Date of Surgery/ Procedure: 2/6/18  Hospital/Surgical Facility: [facility], plan to stay overnight x 2 days     Primary Physician: Deshawn Dowell  Type of Anesthesia Anticipated: General  History of anesthesia complications: NONE  History of  abnormal bleeding: NONE   History of blood transfusions: NO  Patient has a Health Care Directive or Living Will:  NO    Preoperative Questions   1. NO - Do you have a history of heart attack, stroke, stent, bypass or surgery on an artery in the head, neck, heart or legs?  2. NO - Do you ever have any pain or discomfort in your chest?  3. NO - Have you ever had a severe pain across the front of your chest lasting for half an hour or more?  4. NO - Do you have a history of Congestive Heart Failure?  5. NO - Are you troubled by shortness of breath when: walking on the level/ up a slight hill/ at night?  6. NO - Does your chest ever sound wheezy or whistling?  7. NO - Do you currently have a cold, bronchitis or other respiratory infection?  8. NO - Have you had a cold, bronchitis or other respiratory infection within the last 2 weeks?  9. NO - Do you usually have a cough?  10. NO - Do you sometimes get pains in the calves of your legs when you walk?  11. NO - Do you or anyone in your family have previous history of blood clots?  12. NO - Do you or does anyone in your family have a serious bleeding problem such as prolonged bleeding following surgeries or cuts?  13. NO - Have you ever had problems with anemia or been told to take iron pills?  14. NO - Have you had  any abnormal blood loss such as black, tarry or bloody stools, or abnormal vaginal bleeding?  15. NO - Have you ever had a blood transfusion?  16. NO - Have you or any of your relatives ever had problems with anesthesia?  17. NO - Do you have sleep apnea, excessive snoring or daytime drowsiness?  18. NO - Do you have any prosthetic heart valves?  19. NO - Do you have prosthetic joints?  20. NO - Is there any chance that you may be pregnant?    Patient Active Problem List   Diagnosis     Dyslipidemia     Psoriasis     Type 2 diabetes mellitus without complication, without long-term current use of insulin (H)     Skin lesion of face     Chronic kidney disease, stage 2 (mild)     History of pulmonary embolism     Primary osteoarthritis of left knee     Class 2 obesity due to excess calories with serious comorbidity and body mass index (BMI) of 38.0 to 38.9 in adult       Current Outpatient Prescriptions on File Prior to Visit:  lisinopril (PRINIVIL/ZESTRIL) 40 MG tablet Take 1 tablet (40 mg) by mouth daily   multivitamin, therapeutic with minerals (MULTI-VITAMIN) TABS tablet Take 1 tablet by mouth   metFORMIN (GLUCOPHAGE-XR) 500 MG 24 hr tablet Take 2 tablets (1,000 mg) by mouth 2 times daily (with meals)   atorvastatin (LIPITOR) 80 MG tablet Take 1 tablet (80 mg) by mouth daily   Cholecalciferol (VITAMIN D3 PO) Take 4,000 Int'l Units by mouth daily   Ascorbic Acid (VITAMIN C PO) Take 2,000 mg by mouth   aspirin 81 MG EC tablet Take 1 tablet (81 mg) by mouth daily   order for DME Dispense 2 pairs of bilateral ready to wear 20-30mmHg compression stockings to be worn at all times while awake (Patient not taking: Reported on 1/17/2018)   blood glucose (NO BRAND SPECIFIED) lancets standard Use to test blood sugar 4 times daily or as directed. (Patient not taking: Reported on 11/10/2017)   Omega-3 Fatty Acids (OMEGA-3 FISH OIL PO)      No current facility-administered medications on file prior to visit.     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  2 grown children.  Wife works as family medicine clinic director.        Education completed=14     REVIEW OF SYSTEMS:   Constitutional, HEENT, cardiovascular, pulmonary, gi and gu systems are negative, except as otherwise noted.    EXAM:     Patient Vitals for the past 24 hrs:   BP Temp Temp src Pulse Resp SpO2 Height Weight   01/17/18 1526 148/82 - - - - - - -   01/17/18 1438 152/89 - - - - - - -   01/17/18 1430 156/82 98.1  F (36.7  C) Oral 69 18 97 % 5' 8\" (172.7 cm) 255 lb (115.7 kg)     Body mass index is 38.77 kg/(m^2).  GENERAL: healthy, alert and no distress  EYES: Eyes grossly normal to inspection, extraocular movements - intact, and PERRL  HENT: ear canals- normal; TMs- normal; Nose- normal; Mouth- no ulcers, no lesions  NECK: no tenderness, no adenopathy, no asymmetry, no masses, no stiffness; thyroid- normal to palpation  RESP: lungs clear to auscultation - no rales, no rhonchi, no wheezes  CV: regular rates and rhythm, normal S1 S2, no S3 or S4 and no murmur, no click or rub -  ABDOMEN: soft, no tenderness, no  hepatosplenomegaly, no masses, normal bowel sounds  MS: extremities- 2+ LE edema  SKIN: no suspicious lesions, no rashes  NEURO: strength and tone- normal, sensory exam- grossly normal, mentation- intact, speech- normal, reflexes- symmetric  BACK: no CVA tenderness, no paralumbar tenderness  PSYCH: Alert and oriented times 3; speech- coherent , normal rate and volume; able to " articulate logical thoughts  LYMPHATICS: ant. cervical- normal, post. cervical- normal    DIAGNOSTICS:      Hgb 12.6  BMP Cr 1.2  EKG: NSR, no acute ST changes    RISK ASSESSMENT:     Cardiovascular Risk:  -Patient is able to participate in strenuous activities without chest pain.  -The patient does not have chest pain with exertion.  -Patient does not have a history of congestive heart failure.    -The patient does not have a history of stroke and does not have a history of valvular disease.    Pulmonary Risk:  -In terms of risk factors for pulmonary complication, the patient has no risk factors    Perioperative Complications:  -The patient does not have a history of bleeding or clotting problems in the past.    -The patient has not had surgery previously.    -The patient does not have a family history of any anesthesia or surgical complications.      IMPRESSION:   Reason for surgery/procedure: Total knee replacement Left  (Z01.818) Pre-op exam  (primary encounter diagnosis)  Plan: Basic Metabolic Panel ([facility]) - Results < 1         hr, Hemoglobin (HGB) ([facility]), EKG 12-lead         complete w/read - Clinics          (M17.12) Primary osteoarthritis of left knee  Plan: TKA    (I10) Benign essential hypertension    (R60.0) Lower extremity edema    (E66.01,  Z68.38) Class 2 obesity due to excess calories with serious comorbidity and body mass index (BMI) of 38.0 to 38.9 in adult  Plan: Diet and exercise discussion    (E11.9) Type 2 diabetes mellitus without complication, without long-term current use of insulin (H)  Plan: Continue meds    The proposed surgical procedure is considered INTERMEDIATE risk.    For above listed surgery and anesthesia:   Patient is at low risk for surgery/procedure and perioperative/procedure complications.    RECOMMENDATIONS:     Labs:  Hgb, K+ and Creatinine    Fasting:  NPO for 12 hours prior to surgery    Preop Plan:  --Patient is currently taking    Anticoagulant or Antiplatelet  Medication Use  ASPIRIN: Discontinue ASA 7-10 days prior to procedure to reduce bleeding risk.  It should be resumed post-operatively.    --Patient is taking an  Ace inhibitor or Angiotensin Receptor Blocker (ARB) and should HOLD this medication for the 24 hours prior to surgery.      Medications:  Patient should take their regular medications the morning of surgery unless otherwise instructed as above    HTN:  138/80 at the dentist office recently. He will check BPs for next week and if not consistently in 130s will start HCTZ  LE edema: encouraged to try stockings. He is going to try to them     Deshawn Rollins MD R3  Madi Pacheco MD   Precepted with: Madi Pacheco MD    Please contact our office if there are any further questions or information required about this patient.

## 2018-01-17 NOTE — MR AVS SNAPSHOT
After Visit Summary   1/17/2018    Alejandro Crow    MRN: [id number]           Patient Information     Date Of Birth          1950        Visit Information        Provider Department      1/17/2018 2:20 PM Deshawn Dowell MD [facility]        Today's Diagnoses     Pre-op exam    -  1    Benign essential hypertension        Lower extremity edema          Care Instructions      Use the     Cane: good foot leads going up, bad foot leads going down. Start practicing.  Start doing squats at the kitchen sink.    Presurgery Checklist  You are scheduled to have surgery. The healthcare staff will try to make your stay comfortable. Use the guidelines below to remind yourself what to do before surgery. Be sure to follow any specific pre-op instructions from your surgeon or nurse.   Preparing for Surgery  Ask your surgeon if you ll need a blood transfusion during surgery and if so, how to prepare for it. In some cases, you can donate blood before surgery. If needed, this blood can be given back (transfused) to you during or after surgery.  If you are having abdominal surgery, ask what you need to do to clear your bowel.  Tell your surgeon if you have allergies to any medications or foods.  Arrange for an adult family member or friend to drive you home after surgery. If possible, have someone ready to help you at home as you recover.  Call the surgeon if you get a cold, fever, sore throat, diarrhea, or other health problem just before surgery. Your surgeon can decide whether or not to postpone the surgery.  Medications  Tell your surgeon about all medications you take, including prescription and over-the-counter products such as herbal remedies and vitamins. Ask if you should continue taking them.  If you take ibuprofen, naproxen, or  blood thinners  such as aspirin, clopidogrel (Plavix), or warfarin (Coumadin), ask your surgeon whether you should stop taking them and how long before surgery you  should stop.  You may be told to take antibiotics just before surgery to prevent infection. If so, follow instructions carefully on how to take them.  If you are told to take medications called anticoagulants to prevent blood clots after surgery, be sure to follow the instructions on how to take them.  Stop Smoking  If you smoke, healing may take longer. So at least 2 week(s) before surgery, stop smoking.  Bathing or Showering Before Surgery  If instructed, wash with antibacterial soap. Afterward, do not use lotions or powders.  If you are having surgery on the head, you may be asked to shampoo with antibacterial soap. Follow instructions for doing so.  Do Not Remove Hair from the Surgery Site  Do not shave hair from the incision site, unless you are given specific instructions to do so. Usually, if hair needs to be removed, it will be done at the hospital right before surgery.  Don t Eat or Drink  Your doctor will tell you when to stop eating and drinking. If you do not follow your doctor's instructions, your procedure may be postponed or rescheduled for another day.  If your surgeon tells you to continue any medications, take them with small sips of water.  You can brush your teeth and rinse your mouth, but don t swallow any water.  Day of Surgery  Do not wear makeup. Do not use perfume, deodorant, or hairspray. Remove nail polish and artificial nails.  Leave jewelry (including rings), watches, and other valuables at home.  Be sure to bring health insurance cards or forms and a photo ID.  Bring a list of your medications (include the name, dose, how often you take them, and the time last dose was taken).  Arrive on time at the hospital or surgery facility.          Follow-ups after your visit        Who to contact     Please call your clinic at [phone] to:    Ask questions about your health    Make or cancel appointments    Discuss your medicines    Learn about your test results    Speak to your doctor   If  "you have compliments or concerns about an experience at your clinic, or if you wish to file a complaint, please contact [facility] Patient Relations at [phone] or email us at [email or web address]         Additional Information About Your Visit        Care EveryWhere ID     This is your Care EveryWhere ID. This could be used by other organizations to access your [place] medical records  [id number]        Your Vitals Were     Pulse Temperature Respirations Height Pulse Oximetry BMI (Body Mass Index)    69 98.1  F (36.7  C) (Oral) 18 5' 8\" (172.7 cm) 97% 38.77 kg/m2       Blood Pressure from Last 3 Encounters:   01/17/18 148/82   11/29/17 138/86   11/24/17 174/83    Weight from Last 3 Encounters:   01/17/18 255 lb (115.7 kg)   11/29/17 256 lb 9.6 oz (116.4 kg)   11/24/17 257 lb (116.6 kg)              We Performed the Following     Basic Metabolic Panel ([facility]) - Results < 1 hr     EKG 12-lead complete w/read - Clinics     Hemoglobin (HGB) ([facility])        Primary Care Provider Office Phone # Fax #    Deshawn Dowell [phone]       [facility] [address] [place] [address]        Equal Access to Services     LUIS ALLEN AH: Hadii aad ku hadasho Soomaali, waaxda luqadaha, qaybta kaalmada adeegyada, julee hemphill. So [facility] [phone].    ATENCIÓN: Si habla español, tiene a garcia disposición servicios gratuitos de asistencia lingüística. Luna al [phone].    We comply with applicable federal civil rights laws and [place] laws. We do not discriminate on the basis of race, color, national origin, age, disability, sex, sexual orientation, or gender identity.            Thank you!     Thank you for choosing [facility]  for your care. Our goal is always to provide you with excellent care. Hearing back from our patients is one way we can continue to improve our services. Please take a few minutes " to complete the written survey that you may receive in the mail after your visit with us. Thank you!             Your Updated Medication List - Protect others around you: Learn how to safely use, store and throw away your medicines at www.disposemymeds.org.          This list is accurate as of: 1/17/18  3:27 PM.  Always use your most recent med list.                   Brand Name Dispense Instructions for use Diagnosis    aspirin 81 MG EC tablet     90 tablet    Take 1 tablet (81 mg) by mouth daily    Diabetes mellitus, type 2 (H)       atorvastatin 80 MG tablet    LIPITOR    90 tablet    Take 1 tablet (80 mg) by mouth daily    Dyslipidemia       blood glucose lancets standard    no brand specified    1 Box    Use to test blood sugar 4 times daily or as directed.    Type 2 diabetes mellitus with hyperglycemia (H)       lisinopril 40 MG tablet    PRINIVIL/ZESTRIL    90 tablet    Take 1 tablet (40 mg) by mouth daily    Benign essential hypertension       metFORMIN 500 MG 24 hr tablet    GLUCOPHAGE-XR    360 tablet    Take 2 tablets (1,000 mg) by mouth 2 times daily (with meals)    Type 2 diabetes mellitus without complication, without long-term current use of insulin (H)       Multi-vitamin Tabs tablet     100 tablet    Take 1 tablet by mouth        OMEGA-3 FISH OIL PO           order for DME     2 Units    Dispense 2 pairs of bilateral ready to wear 20-30mmHg compression stockings to be worn at all times while awake    Lower extremity edema       VITAMIN C PO      Take 2,000 mg by mouth        VITAMIN D3 PO      Take 4,000 Int'l Units by mouth daily

## 2018-01-17 NOTE — PATIENT INSTRUCTIONS
Use the     Cane: good foot leads going up, bad foot leads going down. Start practicing.  Start doing squats at the kitchen sink.    Presurgery Checklist  You are scheduled to have surgery. The healthcare staff will try to make your stay comfortable. Use the guidelines below to remind yourself what to do before surgery. Be sure to follow any specific pre-op instructions from your surgeon or nurse.   Preparing for Surgery  Ask your surgeon if you ll need a blood transfusion during surgery and if so, how to prepare for it. In some cases, you can donate blood before surgery. If needed, this blood can be given back (transfused) to you during or after surgery.  If you are having abdominal surgery, ask what you need to do to clear your bowel.  Tell your surgeon if you have allergies to any medications or foods.  Arrange for an adult family member or friend to drive you home after surgery. If possible, have someone ready to help you at home as you recover.  Call the surgeon if you get a cold, fever, sore throat, diarrhea, or other health problem just before surgery. Your surgeon can decide whether or not to postpone the surgery.  Medications  Tell your surgeon about all medications you take, including prescription and over-the-counter products such as herbal remedies and vitamins. Ask if you should continue taking them.  If you take ibuprofen, naproxen, or  blood thinners  such as aspirin, clopidogrel (Plavix), or warfarin (Coumadin), ask your surgeon whether you should stop taking them and how long before surgery you should stop.  You may be told to take antibiotics just before surgery to prevent infection. If so, follow instructions carefully on how to take them.  If you are told to take medications called anticoagulants to prevent blood clots after surgery, be sure to follow the instructions on how to take them.  Stop Smoking  If you smoke, healing may take longer. So at least 2 week(s) before surgery, stop  smoking.  Bathing or Showering Before Surgery  If instructed, wash with antibacterial soap. Afterward, do not use lotions or powders.  If you are having surgery on the head, you may be asked to shampoo with antibacterial soap. Follow instructions for doing so.  Do Not Remove Hair from the Surgery Site  Do not shave hair from the incision site, unless you are given specific instructions to do so. Usually, if hair needs to be removed, it will be done at the hospital right before surgery.  Don t Eat or Drink  Your doctor will tell you when to stop eating and drinking. If you do not follow your doctor's instructions, your procedure may be postponed or rescheduled for another day.  If your surgeon tells you to continue any medications, take them with small sips of water.  You can brush your teeth and rinse your mouth, but don t swallow any water.  Day of Surgery  Do not wear makeup. Do not use perfume, deodorant, or hairspray. Remove nail polish and artificial nails.  Leave jewelry (including rings), watches, and other valuables at home.  Be sure to bring health insurance cards or forms and a photo ID.  Bring a list of your medications (include the name, dose, how often you take them, and the time last dose was taken).  Arrive on time at the hospital or surgery facility.

## 2018-01-17 NOTE — NURSING NOTE
1/17/2018 PCS Previsit Plan   DUE FOR:  Advance directive-packet given  Tsh?  Eye ref  Lab before visit for preop-EKG, BMP,Hgb  [facility], check care everywhere  MARK Rico    Preop 2/6/18 left knee surgery in [place] with Dr. Chinedu Diaz with [facility] Phone: [phone] fax: [phone]

## 2018-01-17 NOTE — PROGRESS NOTES
Preceptor Attestation:  Patient's case reviewed and discussed with Deshawn Dowell MD Patient seen and discussed with the resident. and I personally viewed the EKG and agree with the interpretation documented by the resident.. I agree with assessment and plan of care.  Supervising Physician:  Madi Pacheco MD  [facility]

## 2018-01-18 PROBLEM — M17.12 PRIMARY OSTEOARTHRITIS OF LEFT KNEE: Status: ACTIVE | Noted: 2018-01-18

## 2018-01-23 NOTE — NURSING NOTE
Pre-op faxed to fax number : [phone]  Location :  [facility]  Date of Surgery : 02/06/18  By/Date :  Maylillian Chopra

## 2018-03-22 ENCOUNTER — TELEPHONE (OUTPATIENT)
Dept: FAMILY MEDICINE | Facility: CLINIC | Age: 68
End: 2018-03-22

## 2018-03-22 NOTE — TELEPHONE ENCOUNTER
Out reach call, called patient left detailed vmail on identified vmail asking if he has been chekcing his BP at home and if it has been in the rang of 130's or lower for systolic and if not has he started the HCZT yet. Waiting for follow up call. If BP is higher then we should get a nurse visit for BP check

## 2018-05-15 DIAGNOSIS — E11.9 TYPE 2 DIABETES MELLITUS WITHOUT COMPLICATION, WITHOUT LONG-TERM CURRENT USE OF INSULIN (H): ICD-10-CM

## 2018-05-16 ENCOUNTER — OFFICE VISIT - HEALTHEAST (OUTPATIENT)
Dept: FAMILY MEDICINE | Facility: CLINIC | Age: 68
End: 2018-05-16

## 2018-05-16 DIAGNOSIS — I10 ESSENTIAL HYPERTENSION: ICD-10-CM

## 2018-05-16 DIAGNOSIS — Z76.89 ESTABLISHING CARE WITH NEW DOCTOR, ENCOUNTER FOR: ICD-10-CM

## 2018-05-16 DIAGNOSIS — E88.810 METABOLIC SYNDROME: ICD-10-CM

## 2018-05-16 DIAGNOSIS — E11.9 TYPE 2 DIABETES MELLITUS WITHOUT COMPLICATION, WITHOUT LONG-TERM CURRENT USE OF INSULIN (H): ICD-10-CM

## 2018-05-16 LAB — HBA1C MFR BLD: 6.7 % (ref 3.5–6)

## 2018-05-16 ASSESSMENT — MIFFLIN-ST. JEOR: SCORE: 1862.56

## 2018-05-18 RX ORDER — METFORMIN HCL 500 MG
1000 TABLET, EXTENDED RELEASE 24 HR ORAL 2 TIMES DAILY WITH MEALS
Qty: 360 TABLET | Refills: 3 | Status: SHIPPED | OUTPATIENT
Start: 2018-05-18 | End: 2021-10-15

## 2018-07-25 ENCOUNTER — OFFICE VISIT - HEALTHEAST (OUTPATIENT)
Dept: FAMILY MEDICINE | Facility: CLINIC | Age: 68
End: 2018-07-25

## 2018-07-25 ENCOUNTER — RECORDS - HEALTHEAST (OUTPATIENT)
Dept: ADMINISTRATIVE | Facility: OTHER | Age: 68
End: 2018-07-25

## 2018-07-25 DIAGNOSIS — M70.22 OLECRANON BURSITIS, LEFT ELBOW: ICD-10-CM

## 2018-07-27 ENCOUNTER — TELEPHONE (OUTPATIENT)
Dept: FAMILY MEDICINE | Facility: CLINIC | Age: 68
End: 2018-07-27

## 2018-07-27 NOTE — TELEPHONE ENCOUNTER
Called but no answer. Left VM for pt to return call to schedule DM and BP follow up with PCP. --Martín, CMA

## 2018-07-30 ENCOUNTER — RECORDS - HEALTHEAST (OUTPATIENT)
Dept: ADMINISTRATIVE | Facility: OTHER | Age: 68
End: 2018-07-30

## 2018-08-22 ENCOUNTER — OFFICE VISIT - HEALTHEAST (OUTPATIENT)
Dept: FAMILY MEDICINE | Facility: CLINIC | Age: 68
End: 2018-08-22

## 2018-08-22 DIAGNOSIS — E11.9 TYPE 2 DIABETES MELLITUS WITHOUT COMPLICATION, WITHOUT LONG-TERM CURRENT USE OF INSULIN (H): ICD-10-CM

## 2018-08-22 DIAGNOSIS — E88.810 METABOLIC SYNDROME: ICD-10-CM

## 2018-08-22 DIAGNOSIS — I10 ESSENTIAL HYPERTENSION: ICD-10-CM

## 2018-10-01 ENCOUNTER — COMMUNICATION - HEALTHEAST (OUTPATIENT)
Dept: FAMILY MEDICINE | Facility: CLINIC | Age: 68
End: 2018-10-01

## 2018-10-03 ENCOUNTER — RECORDS - HEALTHEAST (OUTPATIENT)
Dept: ADMINISTRATIVE | Facility: OTHER | Age: 68
End: 2018-10-03

## 2018-10-26 ENCOUNTER — OFFICE VISIT - HEALTHEAST (OUTPATIENT)
Dept: FAMILY MEDICINE | Facility: CLINIC | Age: 68
End: 2018-10-26

## 2018-10-26 DIAGNOSIS — I10 ESSENTIAL HYPERTENSION: ICD-10-CM

## 2018-10-26 DIAGNOSIS — E11.9 TYPE 2 DIABETES MELLITUS WITHOUT COMPLICATION, WITHOUT LONG-TERM CURRENT USE OF INSULIN (H): ICD-10-CM

## 2018-10-26 DIAGNOSIS — Z01.818 PRE-OP EXAM: ICD-10-CM

## 2018-10-26 DIAGNOSIS — N18.2 CHRONIC KIDNEY DISEASE, STAGE 2 (MILD): ICD-10-CM

## 2018-10-26 DIAGNOSIS — M17.11 PRIMARY OSTEOARTHRITIS OF RIGHT KNEE: ICD-10-CM

## 2018-10-26 LAB
ATRIAL RATE - MUSE: 49 BPM
DIASTOLIC BLOOD PRESSURE - MUSE: NORMAL MMHG
INTERPRETATION ECG - MUSE: NORMAL
P AXIS - MUSE: -10 DEGREES
PR INTERVAL - MUSE: 136 MS
QRS DURATION - MUSE: 94 MS
QT - MUSE: 458 MS
QTC - MUSE: 413 MS
R AXIS - MUSE: 44 DEGREES
SYSTOLIC BLOOD PRESSURE - MUSE: NORMAL MMHG
T AXIS - MUSE: 30 DEGREES
VENTRICULAR RATE- MUSE: 49 BPM

## 2018-10-26 ASSESSMENT — MIFFLIN-ST. JEOR: SCORE: 1831.38

## 2018-10-30 ENCOUNTER — AMBULATORY - HEALTHEAST (OUTPATIENT)
Dept: LAB | Facility: CLINIC | Age: 68
End: 2018-10-30

## 2018-10-30 DIAGNOSIS — E11.9 TYPE 2 DIABETES MELLITUS WITHOUT COMPLICATION, WITHOUT LONG-TERM CURRENT USE OF INSULIN (H): ICD-10-CM

## 2018-10-30 DIAGNOSIS — I10 ESSENTIAL HYPERTENSION: ICD-10-CM

## 2018-10-30 DIAGNOSIS — Z01.818 PRE-OP EXAM: ICD-10-CM

## 2018-10-30 DIAGNOSIS — M17.11 PRIMARY OSTEOARTHRITIS OF RIGHT KNEE: ICD-10-CM

## 2018-10-30 LAB
ANION GAP SERPL CALCULATED.3IONS-SCNC: 8 MMOL/L (ref 5–18)
BUN SERPL-MCNC: 17 MG/DL (ref 8–28)
CALCIUM SERPL-MCNC: 9.4 MG/DL (ref 8.5–10.5)
CHLORIDE BLD-SCNC: 107 MMOL/L (ref 98–107)
CO2 SERPL-SCNC: 27 MMOL/L (ref 22–31)
CREAT SERPL-MCNC: 0.94 MG/DL (ref 0.7–1.3)
GFR SERPL CREATININE-BSD FRML MDRD: >60 ML/MIN/1.73M2
GLUCOSE BLD-MCNC: 135 MG/DL (ref 70–125)
HBA1C MFR BLD: 5.9 % (ref 3.5–6)
HGB BLD-MCNC: 12.7 G/DL (ref 14–18)
POTASSIUM BLD-SCNC: 4.7 MMOL/L (ref 3.5–5)
SODIUM SERPL-SCNC: 142 MMOL/L (ref 136–145)

## 2018-10-31 ENCOUNTER — COMMUNICATION - HEALTHEAST (OUTPATIENT)
Dept: FAMILY MEDICINE | Facility: CLINIC | Age: 68
End: 2018-10-31

## 2018-11-19 ENCOUNTER — RECORDS - HEALTHEAST (OUTPATIENT)
Dept: ADMINISTRATIVE | Facility: OTHER | Age: 68
End: 2018-11-19

## 2019-01-14 ENCOUNTER — RECORDS - HEALTHEAST (OUTPATIENT)
Dept: ADMINISTRATIVE | Facility: OTHER | Age: 69
End: 2019-01-14

## 2019-03-04 ENCOUNTER — TELEPHONE (OUTPATIENT)
Dept: FAMILY MEDICINE | Facility: CLINIC | Age: 69
End: 2019-03-04

## 2019-03-04 NOTE — TELEPHONE ENCOUNTER
Message to physician: NA    Date of last visit: 1/17/2018     Date of next visit if scheduled: Visit date not found       Last Comprehensive Metabolic Panel:  Sodium   Date Value Ref Range Status   01/17/2018 143.0 133.0 - 144.0 mmol/L Final     Potassium   Date Value Ref Range Status   01/17/2018 3.8 3.4 - 5.3 mmol/L Final     Chloride   Date Value Ref Range Status   01/17/2018 102.0 94.0 - 109.0 mmol/L Final     Carbon Dioxide   Date Value Ref Range Status   01/17/2018 28.0 20.0 - 32.0 mmol/L Final     Glucose   Date Value Ref Range Status   01/17/2018 123.0 (H) 60.0 - 109.0 mg/dL Final     Urea Nitrogen   Date Value Ref Range Status   01/17/2018 13.0 7.0 - 30.0 mg/dL Final     Creatinine   Date Value Ref Range Status   01/17/2018 1.2 0.8 - 1.5 mg/dL Final     GFR Estimate   Date Value Ref Range Status   08/13/2012 > 60 >60 mL/min/1.7 Final     Calcium   Date Value Ref Range Status   01/17/2018 9.3 8.5 - 10.4 mg/dL Final       BP Readings from Last 3 Encounters:   01/17/18 148/82   11/29/17 138/86   11/24/17 174/83       Lab Results   Component Value Date    A1C 6.7 11/10/2017    A1C 6.7 02/24/2017    A1C 6.8 10/06/2016    A1C 14.5 06/30/2016    A1C 6.4 05/07/2015                Please complete refill and CLOSE ENCOUNTER.  Closing the encounter signifies the refill is complete.

## 2019-03-14 ENCOUNTER — COMMUNICATION - HEALTHEAST (OUTPATIENT)
Dept: FAMILY MEDICINE | Facility: CLINIC | Age: 69
End: 2019-03-14

## 2019-03-14 DIAGNOSIS — I10 BENIGN ESSENTIAL HYPERTENSION: ICD-10-CM

## 2019-05-03 ENCOUNTER — COMMUNICATION - HEALTHEAST (OUTPATIENT)
Dept: FAMILY MEDICINE | Facility: CLINIC | Age: 69
End: 2019-05-03

## 2019-05-15 ENCOUNTER — OFFICE VISIT - HEALTHEAST (OUTPATIENT)
Dept: FAMILY MEDICINE | Facility: CLINIC | Age: 69
End: 2019-05-15

## 2019-05-15 DIAGNOSIS — D64.9 ANEMIA, UNSPECIFIED TYPE: ICD-10-CM

## 2019-05-15 DIAGNOSIS — E66.01 CLASS 2 SEVERE OBESITY DUE TO EXCESS CALORIES WITH SERIOUS COMORBIDITY AND BODY MASS INDEX (BMI) OF 38.0 TO 38.9 IN ADULT (H): ICD-10-CM

## 2019-05-15 DIAGNOSIS — E88.810 METABOLIC SYNDROME: ICD-10-CM

## 2019-05-15 DIAGNOSIS — I10 ESSENTIAL HYPERTENSION: ICD-10-CM

## 2019-05-15 DIAGNOSIS — E11.9 TYPE 2 DIABETES MELLITUS WITHOUT COMPLICATION, WITHOUT LONG-TERM CURRENT USE OF INSULIN (H): ICD-10-CM

## 2019-05-15 DIAGNOSIS — E66.812 CLASS 2 SEVERE OBESITY DUE TO EXCESS CALORIES WITH SERIOUS COMORBIDITY AND BODY MASS INDEX (BMI) OF 38.0 TO 38.9 IN ADULT (H): ICD-10-CM

## 2019-05-15 LAB
ALT SERPL W P-5'-P-CCNC: 32 U/L (ref 0–45)
ANION GAP SERPL CALCULATED.3IONS-SCNC: 12 MMOL/L (ref 5–18)
AST SERPL W P-5'-P-CCNC: 26 U/L (ref 0–40)
BUN SERPL-MCNC: 12 MG/DL (ref 8–28)
CALCIUM SERPL-MCNC: 9.8 MG/DL (ref 8.5–10.5)
CHLORIDE BLD-SCNC: 109 MMOL/L (ref 98–107)
CO2 SERPL-SCNC: 24 MMOL/L (ref 22–31)
CREAT SERPL-MCNC: 0.98 MG/DL (ref 0.7–1.3)
CREAT UR-MCNC: 151.8 MG/DL
ERYTHROCYTE [DISTWIDTH] IN BLOOD BY AUTOMATED COUNT: 13.9 % (ref 11–14.5)
GFR SERPL CREATININE-BSD FRML MDRD: >60 ML/MIN/1.73M2
GLUCOSE BLD-MCNC: 139 MG/DL (ref 70–125)
HBA1C MFR BLD: 6.7 % (ref 3.5–6)
HCT VFR BLD AUTO: 42.8 % (ref 40–54)
HGB BLD-MCNC: 14 G/DL (ref 14–18)
MCH RBC QN AUTO: 28 PG (ref 27–34)
MCHC RBC AUTO-ENTMCNC: 32.8 G/DL (ref 32–36)
MCV RBC AUTO: 85 FL (ref 80–100)
MICROALBUMIN UR-MCNC: 1.26 MG/DL (ref 0–1.99)
MICROALBUMIN/CREAT UR: 8.3 MG/G
PLATELET # BLD AUTO: 248 THOU/UL (ref 140–440)
PMV BLD AUTO: 7.6 FL (ref 7–10)
POTASSIUM BLD-SCNC: 4.6 MMOL/L (ref 3.5–5)
RBC # BLD AUTO: 5.02 MILL/UL (ref 4.4–6.2)
SODIUM SERPL-SCNC: 145 MMOL/L (ref 136–145)
WBC: 4.9 THOU/UL (ref 4–11)

## 2019-05-15 ASSESSMENT — MIFFLIN-ST. JEOR: SCORE: 1880.71

## 2019-05-16 ENCOUNTER — COMMUNICATION - HEALTHEAST (OUTPATIENT)
Dept: FAMILY MEDICINE | Facility: CLINIC | Age: 69
End: 2019-05-16

## 2019-05-31 ENCOUNTER — RECORDS - HEALTHEAST (OUTPATIENT)
Dept: ADMINISTRATIVE | Facility: OTHER | Age: 69
End: 2019-05-31

## 2019-05-31 LAB
RETINOPATHY: NEGATIVE
RETINOPATHY: NORMAL

## 2019-06-24 ENCOUNTER — RECORDS - HEALTHEAST (OUTPATIENT)
Dept: HEALTH INFORMATION MANAGEMENT | Facility: CLINIC | Age: 69
End: 2019-06-24

## 2019-09-11 ENCOUNTER — RECORDS - HEALTHEAST (OUTPATIENT)
Dept: ADMINISTRATIVE | Facility: OTHER | Age: 69
End: 2019-09-11

## 2019-09-13 ENCOUNTER — OFFICE VISIT - HEALTHEAST (OUTPATIENT)
Dept: FAMILY MEDICINE | Facility: CLINIC | Age: 69
End: 2019-09-13

## 2019-09-13 DIAGNOSIS — E11.9 TYPE 2 DIABETES MELLITUS WITHOUT COMPLICATION, WITHOUT LONG-TERM CURRENT USE OF INSULIN (H): ICD-10-CM

## 2019-09-13 DIAGNOSIS — E66.01 CLASS 2 SEVERE OBESITY DUE TO EXCESS CALORIES WITH SERIOUS COMORBIDITY AND BODY MASS INDEX (BMI) OF 36.0 TO 36.9 IN ADULT (H): ICD-10-CM

## 2019-09-13 DIAGNOSIS — Z23 FLU VACCINE NEED: ICD-10-CM

## 2019-09-13 DIAGNOSIS — E78.5 DYSLIPIDEMIA: ICD-10-CM

## 2019-09-13 DIAGNOSIS — E66.812 CLASS 2 SEVERE OBESITY DUE TO EXCESS CALORIES WITH SERIOUS COMORBIDITY AND BODY MASS INDEX (BMI) OF 36.0 TO 36.9 IN ADULT (H): ICD-10-CM

## 2019-09-13 DIAGNOSIS — D12.6 ADENOMATOUS POLYP OF COLON, UNSPECIFIED PART OF COLON: ICD-10-CM

## 2019-09-13 DIAGNOSIS — N18.2 CHRONIC KIDNEY DISEASE, STAGE 2 (MILD): ICD-10-CM

## 2019-09-13 DIAGNOSIS — I10 ESSENTIAL HYPERTENSION: ICD-10-CM

## 2019-09-13 LAB
ALT SERPL W P-5'-P-CCNC: 26 U/L (ref 0–45)
ANION GAP SERPL CALCULATED.3IONS-SCNC: 9 MMOL/L (ref 5–18)
BUN SERPL-MCNC: 17 MG/DL (ref 8–28)
CALCIUM SERPL-MCNC: 9.8 MG/DL (ref 8.5–10.5)
CHLORIDE BLD-SCNC: 106 MMOL/L (ref 98–107)
CO2 SERPL-SCNC: 26 MMOL/L (ref 22–31)
CREAT SERPL-MCNC: 1.06 MG/DL (ref 0.7–1.3)
GFR SERPL CREATININE-BSD FRML MDRD: >60 ML/MIN/1.73M2
GLUCOSE BLD-MCNC: 89 MG/DL (ref 70–125)
POTASSIUM BLD-SCNC: 3.9 MMOL/L (ref 3.5–5)
SODIUM SERPL-SCNC: 141 MMOL/L (ref 136–145)

## 2019-09-15 LAB — HBA1C MFR BLD: 6.2 % (ref 4.2–6.1)

## 2019-09-16 ENCOUNTER — COMMUNICATION - HEALTHEAST (OUTPATIENT)
Dept: FAMILY MEDICINE | Facility: CLINIC | Age: 69
End: 2019-09-16

## 2019-09-28 ENCOUNTER — COMMUNICATION - HEALTHEAST (OUTPATIENT)
Dept: FAMILY MEDICINE | Facility: CLINIC | Age: 69
End: 2019-09-28

## 2019-09-28 DIAGNOSIS — E11.9 TYPE 2 DIABETES MELLITUS WITHOUT COMPLICATION, WITHOUT LONG-TERM CURRENT USE OF INSULIN (H): ICD-10-CM

## 2019-12-20 ENCOUNTER — COMMUNICATION - HEALTHEAST (OUTPATIENT)
Dept: FAMILY MEDICINE | Facility: CLINIC | Age: 69
End: 2019-12-20

## 2019-12-20 DIAGNOSIS — I10 BENIGN ESSENTIAL HYPERTENSION: ICD-10-CM

## 2020-06-18 ENCOUNTER — COMMUNICATION - HEALTHEAST (OUTPATIENT)
Dept: FAMILY MEDICINE | Facility: CLINIC | Age: 70
End: 2020-06-18

## 2020-06-18 DIAGNOSIS — I10 BENIGN ESSENTIAL HYPERTENSION: ICD-10-CM

## 2020-08-06 ENCOUNTER — COMMUNICATION - HEALTHEAST (OUTPATIENT)
Dept: FAMILY MEDICINE | Facility: CLINIC | Age: 70
End: 2020-08-06

## 2020-08-11 ENCOUNTER — COMMUNICATION - HEALTHEAST (OUTPATIENT)
Dept: FAMILY MEDICINE | Facility: CLINIC | Age: 70
End: 2020-08-11

## 2020-09-02 ENCOUNTER — RECORDS - HEALTHEAST (OUTPATIENT)
Dept: ADMINISTRATIVE | Facility: OTHER | Age: 70
End: 2020-09-02

## 2020-09-10 ENCOUNTER — OFFICE VISIT - HEALTHEAST (OUTPATIENT)
Dept: FAMILY MEDICINE | Facility: CLINIC | Age: 70
End: 2020-09-10

## 2020-09-10 DIAGNOSIS — E78.5 DYSLIPIDEMIA: ICD-10-CM

## 2020-09-10 DIAGNOSIS — Z00.00 ROUTINE GENERAL MEDICAL EXAMINATION AT A HEALTH CARE FACILITY: ICD-10-CM

## 2020-09-10 DIAGNOSIS — E66.01 CLASS 2 SEVERE OBESITY DUE TO EXCESS CALORIES WITH SERIOUS COMORBIDITY AND BODY MASS INDEX (BMI) OF 36.0 TO 36.9 IN ADULT (H): ICD-10-CM

## 2020-09-10 DIAGNOSIS — Z12.5 SCREENING FOR PROSTATE CANCER: ICD-10-CM

## 2020-09-10 DIAGNOSIS — E88.810 METABOLIC SYNDROME: ICD-10-CM

## 2020-09-10 DIAGNOSIS — I10 ESSENTIAL HYPERTENSION: ICD-10-CM

## 2020-09-10 DIAGNOSIS — E11.9 TYPE 2 DIABETES MELLITUS WITHOUT COMPLICATION, WITHOUT LONG-TERM CURRENT USE OF INSULIN (H): ICD-10-CM

## 2020-09-10 DIAGNOSIS — E66.812 CLASS 2 SEVERE OBESITY DUE TO EXCESS CALORIES WITH SERIOUS COMORBIDITY AND BODY MASS INDEX (BMI) OF 36.0 TO 36.9 IN ADULT (H): ICD-10-CM

## 2020-09-10 LAB
ALBUMIN SERPL-MCNC: 3.7 G/DL (ref 3.5–5)
ALP SERPL-CCNC: 96 U/L (ref 45–120)
ALT SERPL W P-5'-P-CCNC: 23 U/L (ref 0–45)
ANION GAP SERPL CALCULATED.3IONS-SCNC: 11 MMOL/L (ref 5–18)
AST SERPL W P-5'-P-CCNC: 25 U/L (ref 0–40)
BILIRUB SERPL-MCNC: 0.8 MG/DL (ref 0–1)
BUN SERPL-MCNC: 15 MG/DL (ref 8–28)
CALCIUM SERPL-MCNC: 9.2 MG/DL (ref 8.5–10.5)
CHLORIDE BLD-SCNC: 105 MMOL/L (ref 98–107)
CO2 SERPL-SCNC: 25 MMOL/L (ref 22–31)
CREAT SERPL-MCNC: 1.04 MG/DL (ref 0.7–1.3)
CREAT UR-MCNC: 106.3 MG/DL
GFR SERPL CREATININE-BSD FRML MDRD: >60 ML/MIN/1.73M2
GLUCOSE BLD-MCNC: 120 MG/DL (ref 70–125)
HBA1C MFR BLD: 5.6 %
MICROALBUMIN UR-MCNC: <0.5 MG/DL (ref 0–1.99)
MICROALBUMIN/CREAT UR: NORMAL MG/G{CREAT}
POTASSIUM BLD-SCNC: 4.4 MMOL/L (ref 3.5–5)
PROT SERPL-MCNC: 6.7 G/DL (ref 6–8)
PSA SERPL-MCNC: 0.6 NG/ML (ref 0–6.5)
SODIUM SERPL-SCNC: 141 MMOL/L (ref 136–145)

## 2020-09-10 ASSESSMENT — MIFFLIN-ST. JEOR: SCORE: 1786.58

## 2020-09-11 LAB — LDLC SERPL CALC-MCNC: 44 MG/DL

## 2020-09-13 ENCOUNTER — COMMUNICATION - HEALTHEAST (OUTPATIENT)
Dept: FAMILY MEDICINE | Facility: CLINIC | Age: 70
End: 2020-09-13

## 2020-09-13 DIAGNOSIS — I10 BENIGN ESSENTIAL HYPERTENSION: ICD-10-CM

## 2020-09-15 ENCOUNTER — COMMUNICATION - HEALTHEAST (OUTPATIENT)
Dept: FAMILY MEDICINE | Facility: CLINIC | Age: 70
End: 2020-09-15

## 2020-09-21 ENCOUNTER — COMMUNICATION - HEALTHEAST (OUTPATIENT)
Dept: FAMILY MEDICINE | Facility: CLINIC | Age: 70
End: 2020-09-21

## 2020-09-21 DIAGNOSIS — E11.9 TYPE 2 DIABETES MELLITUS WITHOUT COMPLICATION, WITHOUT LONG-TERM CURRENT USE OF INSULIN (H): ICD-10-CM

## 2021-03-01 ENCOUNTER — COMMUNICATION - HEALTHEAST (OUTPATIENT)
Dept: SCHEDULING | Facility: CLINIC | Age: 71
End: 2021-03-01

## 2021-03-01 ENCOUNTER — COMMUNICATION - HEALTHEAST (OUTPATIENT)
Dept: ADMINISTRATIVE | Facility: CLINIC | Age: 71
End: 2021-03-01

## 2021-03-04 ENCOUNTER — OFFICE VISIT - HEALTHEAST (OUTPATIENT)
Dept: FAMILY MEDICINE | Facility: CLINIC | Age: 71
End: 2021-03-04

## 2021-03-04 DIAGNOSIS — Z09 HOSPITAL DISCHARGE FOLLOW-UP: ICD-10-CM

## 2021-03-04 DIAGNOSIS — E11.9 TYPE 2 DIABETES MELLITUS WITHOUT COMPLICATION, WITHOUT LONG-TERM CURRENT USE OF INSULIN (H): ICD-10-CM

## 2021-03-04 DIAGNOSIS — S22.49XA MULTIPLE RIB FRACTURES INVOLVING FOUR OR MORE RIBS: ICD-10-CM

## 2021-03-04 LAB — HBA1C MFR BLD: 6.7 %

## 2021-03-04 ASSESSMENT — MIFFLIN-ST. JEOR: SCORE: 1865.97

## 2021-03-28 ENCOUNTER — HEALTH MAINTENANCE LETTER (OUTPATIENT)
Age: 71
End: 2021-03-28

## 2021-05-27 ENCOUNTER — RECORDS - HEALTHEAST (OUTPATIENT)
Dept: ADMINISTRATIVE | Facility: CLINIC | Age: 71
End: 2021-05-27

## 2021-05-28 NOTE — PROGRESS NOTES
Assessment/Plan:    Type 2 diabetes mellitus without complication, without long-term current use of insulin (H)  The patient is doing well overall.  He is taking metformin without side effects.  He has had a dramatic increase in physical activity over the past couple of months after successful recovery from total right knee arthroscopic.  He has gained weight which she attributes to being mostly sedentary for the winter up until his recent effort to improve his physical activity.  - Laboratory testing today.  Diabetic foot exam completed today.  - Continue aspirin.  Continue statin.  The patient does not smoke.  Blood pressure is well controlled.  - We discussed insulin resistance as the principal pathophysiology of diabetes.  The patient will work to introduce some element of intermittent fasting to his meal plan and work to reduce process/refined carbohydrates.  -Return to clinic in 3 months for follow-up conversation given worsening of control and weight gain since I last saw him.    Essential hypertension  Control remains adequate.  He is tolerant of lisinopril.  Continue current medications.  Check basic Jenise panel.  Return to clinic for recheck in 3 to 6 months.    Greater than 25 minutes of total time was spent with patient of which greater than 50% was spent on counseling and coordination of care.    Return in about 3 months (around 8/15/2019) for Diabetes.    Venkata Benson MD  _______________________________    Chief Complaint   Patient presents with     Follow-up     diabetes-pt is not fasting      Subjective: Alejandro Crow is a 69 y.o. year old male who returns to clinic for the following chronic complaints/concerns:     DM:   - the patient successfully had his knee replaced.  Doing well.  Recovery was challenging for the patient.  Struggled with swelling and pain.     - exercise: has been going to the [employer].  The patient has been working to improve his core strength.  The patient has been working  "with a .  He feels better.   Planning to golf 2-3x/week.    - blood sugars: not checking sugars.  He notes that he has gained weight.  Less active with the initial part of the winter.   He anticipates that he will lose weight because of activity.      BP:   - no lightheadedness.  No dizziness.  He takes his medications.    Review of systems is negative except for as shown in the HPI.    The following portions of the patient's history were reviewed and updated as appropriate: allergies, current medications, past medical history, past surgical history and problem list.    Objective:    height is 5' 8\" (1.727 m) and weight is 256 lb (116.1 kg) (abnormal). His blood pressure is 130/72 and his pulse is 60. His respiration is 16 and oxygen saturation is 98%.   Gen: No acute distress, pleasant.  Cardiac: Regular rate and rhythm, normal S1/S2, no murmurs of the gallops  Respiratory: Clear to auscultation bilaterally.  Psych: Normal affect  Foot exam: Dorsal pedal pulses present bilaterally.  No ulceration.  No sores.  The patient has normal mobility in all of the digits.  Monofilament exam is normal the most distal dermatomal distribution.  Vibratory sense is normal.  2+ reflexes at the Achilles tendon.  Babinski normal.    Recent Results (from the past 24 hour(s))   Glycosylated Hemoglobin A1c   Result Value Ref Range    Hemoglobin A1c 6.7 (H) 3.5 - 6.0 %   HM2(CBC w/o Differential)   Result Value Ref Range    WBC 4.9 4.0 - 11.0 thou/uL    RBC 5.02 4.40 - 6.20 mill/uL    Hemoglobin 14.0 14.0 - 18.0 g/dL    Hematocrit 42.8 40.0 - 54.0 %    MCV 85 80 - 100 fL    MCH 28.0 27.0 - 34.0 pg    MCHC 32.8 32.0 - 36.0 g/dL    RDW 13.9 11.0 - 14.5 %    Platelets 248 140 - 440 thou/uL    MPV 7.6 7.0 - 10.0 fL       Additional History from Old Records Summarized (2): no  Decision to Obtain Records (1): no  Radiology Tests Summarized or Ordered (1): no  Labs Reviewed or Ordered (1): yes  Medicine Test Summarized or " Ordered (1): no  Independent Review of EKG or X-RAY(2 each): no    This note has been dictated using voice recognition software. Any grammatical or context distortions are unintentional and inherent to the software

## 2021-05-28 NOTE — PATIENT INSTRUCTIONS - HE
Dr. Igor Valverde MD   - The Obesity Code   - [employer]    Dr. Alfredo Arrieta MD   - Always Hungry    Dr. Jojo Hillman DO.   Search for her name in Youtube with added criteria: Pedro Gonsalez

## 2021-05-31 ENCOUNTER — RECORDS - HEALTHEAST (OUTPATIENT)
Dept: ADMINISTRATIVE | Facility: CLINIC | Age: 71
End: 2021-05-31

## 2021-06-01 VITALS — HEIGHT: 68 IN | WEIGHT: 252 LBS | BODY MASS INDEX: 38.19 KG/M2

## 2021-06-01 VITALS — WEIGHT: 238 LBS | BODY MASS INDEX: 36.19 KG/M2

## 2021-06-01 VITALS — WEIGHT: 236 LBS | BODY MASS INDEX: 35.88 KG/M2

## 2021-06-01 NOTE — TELEPHONE ENCOUNTER
Refill Approved    Rx renewed per Medication Renewal Policy. Medication was last renewed on 5/126/2018         César Putnam, [facility] Connection Triage/Med Refill 9/29/2019     Requested Prescriptions   Pending Prescriptions Disp Refills     metFORMIN (GLUCOPHAGE-XR) 500 MG 24 hr tablet [Pharmacy Med Name: METFORMIN HCL  MG TABLET] 360 tablet 3     Sig: TAKE 2 TABLETS BY MOUTH 2 TIMES DAILY WITH MEALS       Metformin Refill Protocol Passed - 9/28/2019 12:59 PM        Passed - Blood pressure in last 12 months     BP Readings from Last 1 Encounters:   09/13/19 128/80             Passed - LFT or AST or ALT in last 12 months     AST   Date Value Ref Range Status   05/15/2019 26 0 - 40 U/L Final     ALT   Date Value Ref Range Status   09/13/2019 26 0 - 45 U/L Final                Passed - GFR or Serum Creatinine in last 6 months     GFR MDRD Non Af Amer   Date Value Ref Range Status   09/13/2019 >60 >60 mL/min/1.73m2 Final     GFR MDRD Af Amer   Date Value Ref Range Status   09/13/2019 >60 >60 mL/min/1.73m2 Final             Passed - Visit with PCP or prescribing provider visit in last 6 months or next 3 months     Last office visit with prescriber/PCP: 9/13/2019 OR same dept: 9/13/2019 Venkata Benson MD OR same specialty: 9/13/2019 Venkata Benson MD Last physical: Visit date not found Last MTM visit: Visit date not found         Next appt within 3 mo: Visit date not found  Next physical within 3 mo: Visit date not found  Prescriber OR PCP: Venkata Benson MD  Last diagnosis associated with med order: There are no diagnoses linked to this encounter.   If protocol passes may refill for 12 months if within 3 months of last provider visit (or a total of 15 months).           Passed - A1C in last 6 months     Hemoglobin A1c   Date Value Ref Range Status   09/13/2019 6.2 (H) 4.2 - 6.1 % Final               Passed - Microalbumin in last year      Microalbumin, Random Urine   Date Value Ref Range Status    05/15/2019 1.26 0.00 - 1.99 mg/dL Final

## 2021-06-02 VITALS — BODY MASS INDEX: 37.28 KG/M2 | HEIGHT: 68 IN | WEIGHT: 246 LBS

## 2021-06-03 VITALS — HEIGHT: 68 IN | BODY MASS INDEX: 38.8 KG/M2 | WEIGHT: 256 LBS

## 2021-06-03 VITALS
BODY MASS INDEX: 36.95 KG/M2 | SYSTOLIC BLOOD PRESSURE: 128 MMHG | DIASTOLIC BLOOD PRESSURE: 80 MMHG | WEIGHT: 243 LBS | HEART RATE: 68 BPM

## 2021-06-04 NOTE — TELEPHONE ENCOUNTER
Refill Approved    Rx renewed per Medication Renewal Policy. Medication was last renewed on 3/18/19.    Monica Alberts, [facility] Triage/Med Refill 12/23/2019     Requested Prescriptions   Pending Prescriptions Disp Refills     atorvastatin (LIPITOR) 80 MG tablet [Pharmacy Med Name: ATORVASTATIN 80 MG TABLET] 90 tablet 2     Sig: TAKE 1 TABLET BY MOUTH DAILY       Statins Refill Protocol (Hmg CoA Reductase Inhibitors) Passed - 12/20/2019  2:10 AM        Passed - PCP or prescribing provider visit in past 12 months      Last office visit with prescriber/PCP: 9/13/2019 Venkata Benson MD OR same dept: 9/13/2019 Venkata Benson MD OR same specialty: 9/13/2019 Venkata Benson MD  Last physical: 10/26/2018 Last MTM visit: Visit date not found   Next visit within 3 mo: Visit date not found  Next physical within 3 mo: Visit date not found  Prescriber OR PCP: Venkata Benson MD  Last diagnosis associated with med order: 1. Benign essential hypertension  - atorvastatin (LIPITOR) 80 MG tablet [Pharmacy Med Name: ATORVASTATIN 80 MG TABLET]; TAKE 1 TABLET BY MOUTH DAILY  Dispense: 90 tablet; Refill: 2  - lisinopril (PRINIVIL,ZESTRIL) 40 MG tablet [Pharmacy Med Name: LISINOPRIL 40 MG TABLET]; TAKE 1 TABLET BY MOUTH ONCE DAILY  Dispense: 90 tablet; Refill: 2    If protocol passes may refill for 12 months if within 3 months of last provider visit (or a total of 15 months).             lisinopril (PRINIVIL,ZESTRIL) 40 MG tablet [Pharmacy Med Name: LISINOPRIL 40 MG TABLET] 90 tablet 2     Sig: TAKE 1 TABLET BY MOUTH ONCE DAILY       Ace Inhibitors Refill Protocol Passed - 12/20/2019  2:10 AM        Passed - PCP or prescribing provider visit in past 12 months       Last office visit with prescriber/PCP: 9/13/2019 Venkata Benson MD OR same dept: 9/13/2019 Venkata Benson MD OR same specialty: 9/13/2019 Venkata Benson MD  Last physical: 10/26/2018 Last MTM visit: Visit date not found   Next visit within 3  mo: Visit date not found  Next physical within 3 mo: Visit date not found  Prescriber OR PCP: Venkata Benson MD  Last diagnosis associated with med order: 1. Benign essential hypertension  - atorvastatin (LIPITOR) 80 MG tablet [Pharmacy Med Name: ATORVASTATIN 80 MG TABLET]; TAKE 1 TABLET BY MOUTH DAILY  Dispense: 90 tablet; Refill: 2  - lisinopril (PRINIVIL,ZESTRIL) 40 MG tablet [Pharmacy Med Name: LISINOPRIL 40 MG TABLET]; TAKE 1 TABLET BY MOUTH ONCE DAILY  Dispense: 90 tablet; Refill: 2    If protocol passes may refill for 12 months if within 3 months of last provider visit (or a total of 15 months).             Passed - Serum Potassium in past 12 months     Lab Results   Component Value Date    Potassium 3.9 09/13/2019             Passed - Blood pressure filed in past 12 months     BP Readings from Last 1 Encounters:   09/13/19 128/80             Passed - Serum Creatinine in past 12 months     Creatinine   Date Value Ref Range Status   09/13/2019 1.06 0.70 - 1.30 mg/dL Final

## 2021-06-05 VITALS
SYSTOLIC BLOOD PRESSURE: 138 MMHG | HEART RATE: 64 BPM | TEMPERATURE: 97.8 F | RESPIRATION RATE: 18 BRPM | DIASTOLIC BLOOD PRESSURE: 68 MMHG | WEIGHT: 251 LBS | BODY MASS INDEX: 37.18 KG/M2 | HEIGHT: 69 IN | OXYGEN SATURATION: 96 %

## 2021-06-05 VITALS
HEART RATE: 52 BPM | TEMPERATURE: 98 F | DIASTOLIC BLOOD PRESSURE: 62 MMHG | BODY MASS INDEX: 35.92 KG/M2 | SYSTOLIC BLOOD PRESSURE: 122 MMHG | HEIGHT: 68 IN | RESPIRATION RATE: 18 BRPM | WEIGHT: 237 LBS | OXYGEN SATURATION: 97 %

## 2021-06-09 NOTE — TELEPHONE ENCOUNTER
Refills Approved x 2     Rx renewed per Medication Renewal Policy. Medications were both last renewed on 12/23/2019 with 1 refill each.  Last office visit: 9/13/2019 with PCP Dr MUKUL Rhodes, [facility] Triage/Med Refill 6/18/2020     Requested Prescriptions   Pending Prescriptions Disp Refills     atorvastatin (LIPITOR) 80 MG tablet [Pharmacy Med Name: ATORVASTATIN 80 MG TABLET] 90 tablet 1     Sig: TAKE 1 TABLET BY MOUTH EVERY DAY       Statins Refill Protocol (Hmg CoA Reductase Inhibitors) Passed - 6/18/2020 12:32 AM        Passed - PCP or prescribing provider visit in past 12 months      Last office visit with prescriber/PCP: 9/13/2019 Venkata Benson MD OR same dept: 9/13/2019 Venkata Benson MD OR same specialty: 9/13/2019 Venkata Benson MD  Last physical: 10/26/2018 Last MTM visit: Visit date not found   Next visit within 3 mo: Visit date not found  Next physical within 3 mo: Visit date not found  Prescriber OR PCP: Venkata Benson MD  Last diagnosis associated with med order: 1. Benign essential hypertension  - atorvastatin (LIPITOR) 80 MG tablet [Pharmacy Med Name: ATORVASTATIN 80 MG TABLET]; TAKE 1 TABLET BY MOUTH EVERY DAY  Dispense: 90 tablet; Refill: 1  - lisinopriL (PRINIVIL,ZESTRIL) 40 MG tablet [Pharmacy Med Name: LISINOPRIL 40 MG TABLET]; TAKE 1 TABLET BY MOUTH EVERY DAY  Dispense: 90 tablet; Refill: 1    If protocol passes may refill for 12 months if within 3 months of last provider visit (or a total of 15 months).                lisinopriL (PRINIVIL,ZESTRIL) 40 MG tablet [Pharmacy Med Name: LISINOPRIL 40 MG TABLET] 90 tablet 1     Sig: TAKE 1 TABLET BY MOUTH EVERY DAY       Ace Inhibitors Refill Protocol Passed - 6/18/2020 12:32 AM        Passed - PCP or prescribing provider visit in past 12 months       Last office visit with prescriber/PCP: 9/13/2019 Venkata Benson MD OR same dept: 9/13/2019 Venkata Benson MD OR same specialty: 9/13/2019 [facility]  MD Venkata  Last physical: 10/26/2018 Last MTM visit: Visit date not found   Next visit within 3 mo: Visit date not found  Next physical within 3 mo: Visit date not found  Prescriber OR PCP: Venkata Benson MD  Last diagnosis associated with med order: 1. Benign essential hypertension  - atorvastatin (LIPITOR) 80 MG tablet [Pharmacy Med Name: ATORVASTATIN 80 MG TABLET]; TAKE 1 TABLET BY MOUTH EVERY DAY  Dispense: 90 tablet; Refill: 1  - lisinopriL (PRINIVIL,ZESTRIL) 40 MG tablet [Pharmacy Med Name: LISINOPRIL 40 MG TABLET]; TAKE 1 TABLET BY MOUTH EVERY DAY  Dispense: 90 tablet; Refill: 1    If protocol passes may refill for 12 months if within 3 months of last provider visit (or a total of 15 months).             Passed - Serum Potassium in past 12 months     Lab Results   Component Value Date    Potassium 3.9 09/13/2019             Passed - Blood pressure filed in past 12 months     BP Readings from Last 1 Encounters:   09/13/19 128/80             Passed - Serum Creatinine in past 12 months     Creatinine   Date Value Ref Range Status   09/13/2019 1.06 0.70 - 1.30 mg/dL Final

## 2021-06-10 NOTE — TELEPHONE ENCOUNTER
Left message to call back for: Kranthi   Information to relay to patient:  Pt is due for a diabetic check up, please schedule an appointment when he calls back.    Dr. Dobson is working out of the [facility] beginning August 10th    Thank you,    Rosa M Blue LPN

## 2021-06-11 NOTE — PATIENT INSTRUCTIONS - HE
Advance Directive  Patient s advance directive was discussed and I am comfortable with the patient s wishes.  Patient Education   Personalized Prevention Plan  You are due for the preventive services outlined below.  Your care team is available to assist you in scheduling these services.  If you have already completed any of these items, please share that information with your care team to update in your medical record.  Health Maintenance   Topic Date Due     HEPATITIS B VACCINES (1 of 3 - Risk 3-dose series) 02[birth year]17/1967     LIPID  02/05/2016     A1C  03/13/2020     DIABETIC FOOT EXAM  05/15/2020     MICROALBUMIN  05/15/2020     FALL RISK ASSESSMENT  05/15/2020     DIABETIC EYE EXAM  05/31/2020     INFLUENZA VACCINE RULE BASED (1) 08/01/2020     BMP  09/13/2020     TD 18+ HE  09/24/2020 (Originally 4/27/2020)     ZOSTER VACCINES (2 of 3) 09/24/2020 (Originally 1/22/2010)     MEDICARE ANNUAL WELLNESS VISIT  09/10/2021     ADVANCE CARE PLANNING  05/16/2023     COLORECTAL CANCER SCREENING  09/02/2030     HEPATITIS C SCREENING  Completed     PNEUMOCOCCAL IMMUNIZATION 65+ HIGH/HIGHEST RISK  Completed

## 2021-06-11 NOTE — PROGRESS NOTES
Assessment and Plan:     Problem List Items Addressed This Visit     Class 2 severe obesity due to excess calories with serious comorbidity and body mass index (BMI) of 36.0 to 36.9 in adult (H)    Dyslipidemia    Relevant Orders    Lipid Cascade FASTING    Type 2 diabetes mellitus without complication, without long-term current use of insulin (H)     This patient's diabetes is controlled.     -Hemoglobin A1c: controlled   -blood pressure: controlled   -statin: Yes   -aspirin: YES   -tobacco status:     Tobacco Use      Smoking status: Former Smoker      Smokeless tobacco: Never Used           Relevant Orders    Glycosylated Hemoglobin A1c (Completed)    Microalbumin, Random Urine    Comprehensive Metabolic Panel     DIABETES FOOT EXAM (Completed)    Lipid Dexter FASTING    Metabolic syndrome    Relevant Orders     DIABETES FOOT EXAM (Completed)    Lipid Dexter FASTING    Essential hypertension     Normotensive.  Check electrolytes.           Relevant Orders    Comprehensive Metabolic Panel    Routine general medical examination at a health care facility - Primary     Doing great. Energy has been high.  Active with fishing and golfing.   - check fasting labs.   - no LUTS. Check PSA as has been his custom.            Other Visit Diagnoses     Screening for prostate cancer        Relevant Orders    PSA (Prostatic-Specific Antigen), Annual Screen        The patient's current medical problems were reviewed.    I have had an Advance Directives discussion with the patient.  The following health maintenance schedule was reviewed with the patient and provided in printed form in the after visit summary:   Health Maintenance   Topic Date Due     HEPATITIS B VACCINES (1 of 3 - Risk 3-dose series) 02[birth year]17/1967     LIPID  02/05/2016     A1C  03/13/2020     DIABETIC FOOT EXAM  05/15/2020     MICROALBUMIN  05/15/2020     FALL RISK ASSESSMENT  05/15/2020     DIABETIC EYE EXAM  05/31/2020     INFLUENZA VACCINE RULE BASED (1)  08/01/2020     BMP  09/13/2020     TD 18+ HE  09/24/2020 (Originally 4/27/2020)     ZOSTER VACCINES (2 of 3) 09/24/2020 (Originally 1/22/2010)     MEDICARE ANNUAL WELLNESS VISIT  09/10/2021     ADVANCE CARE PLANNING  05/16/2023     COLORECTAL CANCER SCREENING  09/02/2030     HEPATITIS C SCREENING  Completed     PNEUMOCOCCAL IMMUNIZATION 65+ HIGH/HIGHEST RISK  Completed        Subjective:   Chief Complaint: Alejandro Crow is an 70 y.o. male here for an Annual Wellness visit.   HPI:      He has been spending time with high school friends (from [employer]).  Doing really well. Golfing a lot. Fishing a lot.  No concerns today.     Review of Systems:  Please see above.  The rest of the review of systems are negative for all systems.    Patient Care Team:  Venkata Benson MD as PCP - General (Family Medicine)  Venkata Benson MD as Assigned PCP     Patient Active Problem List   Diagnosis     Chronic kidney disease, stage 2 (mild)     Class 2 severe obesity due to excess calories with serious comorbidity and body mass index (BMI) of 36.0 to 36.9 in adult (H)     Dyslipidemia     History of pulmonary embolism     Psoriasis     Skin lesion of face     Type 2 diabetes mellitus without complication, without long-term current use of insulin (H)     Metabolic syndrome     Essential hypertension     Routine general medical examination at a health care facility     Past Medical History:   Diagnosis Date     JUDITH (acute kidney injury) (H)       Past Surgical History:   Procedure Laterality Date     CATARACT EXTRACTION      bilateral eyes      TOTAL KNEE ARTHROPLASTY      both knees       Family History   Problem Relation Age of Onset     Dementia Mother      Hypertension Mother      Breast cancer Mother      Macular degeneration Father      Heart failure Father      Other Father         kidney issues      No Medical Problems Sister      Stroke Paternal Grandmother      Colon cancer Neg Hx      Prostate cancer Neg Hx        Social History     Socioeconomic History     Marital status:      Spouse name: Not on file     Number of children: Not on file     Years of education: Not on file     Highest education level: Not on file   Occupational History     Not on file   Social Needs     Financial resource strain: Not on file     Food insecurity     Worry: Not on file     Inability: Not on file     Transportation needs     Medical: Not on file     Non-medical: Not on file   Tobacco Use     Smoking status: Former Smoker     Smokeless tobacco: Never Used   Substance and Sexual Activity     Alcohol use: Yes     Comment: 1 drink every 3-4 months      Drug use: No     Sexual activity: Not Currently   Lifestyle     Physical activity     Days per week: Not on file     Minutes per session: Not on file     Stress: Not on file   Relationships     Social connections     Talks on phone: Not on file     Gets together: Not on file     Attends Yarsanism service: Not on file     Active member of club or organization: Not on file     Attends meetings of clubs or organizations: Not on file     Relationship status: Not on file     Intimate partner violence     Fear of current or ex partner: Not on file     Emotionally abused: Not on file     Physically abused: Not on file     Forced sexual activity: Not on file   Other Topics Concern     Not on file   Social History Narrative     Not on file      Current Outpatient Medications   Medication Sig Dispense Refill     ascorbic acid, vitamin C, (VITAMIN C) 1000 MG tablet Take 2,000 mg by mouth daily.       aspirin 81 mg chewable tablet Chew 81 mg daily.       atorvastatin (LIPITOR) 80 MG tablet Take 1 tablet (80 mg total) by mouth daily. 90 tablet 0     cholecalciferol, vitamin D3, (VITAMIN D3) 4,000 unit cap Take by mouth. 2 tablets oral daily       clobetasol (TEMOVATE) 0.05 % cream Apply 1 application topically 2 (two) times a day.       docosahexanoic acid/epa (FISH OIL ORAL) Take by mouth. 3600mg per  "day       lisinopriL (PRINIVIL,ZESTRIL) 40 MG tablet Take 1 tablet (40 mg total) by mouth daily. 90 tablet 0     metFORMIN (GLUCOPHAGE-XR) 500 MG 24 hr tablet TAKE 2 TABLETS BY MOUTH 2 TIMES DAILY WITH MEALS 360 tablet 3     multivitamin therapeutic tablet Take 1 tablet by mouth daily.       No current facility-administered medications for this visit.       Objective:   Vital Signs:   Visit Vitals  /62 (Patient Site: Left Arm, Patient Position: Sitting, Cuff Size: Adult Large)   Pulse (!) 52   Temp 98  F (36.7  C) (Oral)   Resp 18   Ht 5' 7.5\" (1.715 m)   Wt (!) 237 lb (107.5 kg)   SpO2 97% Comment: room air   BMI 36.57 kg/m       VisionScreening:  No exam data present     PHYSICAL EXAM  Physical Exam  Vitals signs reviewed.   Constitutional:       General: He is not in acute distress.     Appearance: He is well-developed.   HENT:      Head: Normocephalic and atraumatic.      Right Ear: External ear normal.      Left Ear: External ear normal.      Nose: Nose normal.      Mouth/Throat:      Pharynx: No oropharyngeal exudate.   Eyes:      General: No scleral icterus.        Right eye: No discharge.         Left eye: No discharge.      Conjunctiva/sclera: Conjunctivae normal.      Pupils: Pupils are equal, round, and reactive to light.   Neck:      Musculoskeletal: Normal range of motion.      Thyroid: No thyromegaly.   Cardiovascular:      Rate and Rhythm: Normal rate and regular rhythm.      Heart sounds: Normal heart sounds. No murmur. No friction rub. No gallop.    Pulmonary:      Effort: Pulmonary effort is normal. No respiratory distress.      Breath sounds: Normal breath sounds. No wheezing.   Abdominal:      General: There is no distension.      Palpations: Abdomen is soft. There is no mass.      Tenderness: There is no abdominal tenderness.   Musculoskeletal: Normal range of motion.   Feet:      Comments: Diabetic foot exam: normal DP and PT pulses, no trophic changes or ulcerative lesions and normal " sensory exam.  Normal monofilament exam.     Lymphadenopathy:      Cervical: No cervical adenopathy.   Skin:     General: Skin is warm.      Comments: Legs with psoriatic plaques. Chronic venous stasis changes in lower extremities bilaterally.    Neurological:      Mental Status: He is alert and oriented to person, place, and time.      Cranial Nerves: No cranial nerve deficit.      Motor: No abnormal muscle tone.      Deep Tendon Reflexes: Reflexes are normal and symmetric.   Psychiatric:         Behavior: Behavior normal.         Thought Content: Thought content normal.         Judgment: Judgment normal.       Assessment Results 9/10/2020   Activities of Daily Living No help needed   Instrumental Activities of Daily Living No help needed   Mini Cog Total Score 5   Some recent data might be hidden     A Mini-Cog score of 0-2 suggests the possibility of dementia, score of 3-5 suggests no dementia      Identified Health Risks:     Patient's advanced directive was discussed and I am comfortable with the patient's wishes.

## 2021-06-11 NOTE — TELEPHONE ENCOUNTER
Refill Approved    Rx renewed per Medication Renewal Policy. Medication was last renewed on 6/18/20.    Bailey Garcia, [facility] Triage/Med Refill 9/14/2020     Requested Prescriptions   Pending Prescriptions Disp Refills     atorvastatin (LIPITOR) 80 MG tablet [Pharmacy Med Name: ATORVASTATIN 80 MG TABLET] 90 tablet 0     Sig: TAKE 1 TABLET BY MOUTH EVERY DAY       Statins Refill Protocol (Hmg CoA Reductase Inhibitors) Passed - 9/13/2020  9:46 AM        Passed - PCP or prescribing provider visit in past 12 months      Last office visit with prescriber/PCP: 9/13/2019 Venkata Benson MD OR same dept: Visit date not found OR same specialty: 9/13/2019 Venkata Benson MD  Last physical: 9/10/2020 Last MTM visit: Visit date not found   Next visit within 3 mo: Visit date not found  Next physical within 3 mo: Visit date not found  Prescriber OR PCP: Venkata Benson MD  Last diagnosis associated with med order: 1. Benign essential hypertension  - atorvastatin (LIPITOR) 80 MG tablet [Pharmacy Med Name: ATORVASTATIN 80 MG TABLET]; TAKE 1 TABLET BY MOUTH EVERY DAY  Dispense: 90 tablet; Refill: 0  - lisinopriL (PRINIVIL,ZESTRIL) 40 MG tablet [Pharmacy Med Name: LISINOPRIL 40 MG TABLET]; TAKE 1 TABLET BY MOUTH EVERY DAY  Dispense: 90 tablet; Refill: 0    If protocol passes may refill for 12 months if within 3 months of last provider visit (or a total of 15 months).                lisinopriL (PRINIVIL,ZESTRIL) 40 MG tablet [Pharmacy Med Name: LISINOPRIL 40 MG TABLET] 90 tablet 0     Sig: TAKE 1 TABLET BY MOUTH EVERY DAY       Ace Inhibitors Refill Protocol Passed - 9/13/2020  9:46 AM        Passed - PCP or prescribing provider visit in past 12 months       Last office visit with prescriber/PCP: 9/13/2019 Venkata Benson MD OR same dept: Visit date not found OR same specialty: 9/13/2019 Venkata Benson MD  Last physical: 9/10/2020 Last MTM visit: Visit date not found   Next visit within 3 mo: Visit date not  found  Next physical within 3 mo: Visit date not found  Prescriber OR PCP: Venkata Benson MD  Last diagnosis associated with med order: 1. Benign essential hypertension  - atorvastatin (LIPITOR) 80 MG tablet [Pharmacy Med Name: ATORVASTATIN 80 MG TABLET]; TAKE 1 TABLET BY MOUTH EVERY DAY  Dispense: 90 tablet; Refill: 0  - lisinopriL (PRINIVIL,ZESTRIL) 40 MG tablet [Pharmacy Med Name: LISINOPRIL 40 MG TABLET]; TAKE 1 TABLET BY MOUTH EVERY DAY  Dispense: 90 tablet; Refill: 0    If protocol passes may refill for 12 months if within 3 months of last provider visit (or a total of 15 months).             Passed - Serum Potassium in past 12 months     Lab Results   Component Value Date    Potassium 4.4 09/10/2020             Passed - Blood pressure filed in past 12 months     BP Readings from Last 1 Encounters:   09/10/20 122/62             Passed - Serum Creatinine in past 12 months     Creatinine   Date Value Ref Range Status   09/10/2020 1.04 0.70 - 1.30 mg/dL Final

## 2021-06-11 NOTE — TELEPHONE ENCOUNTER
Refill Approved    Rx renewed per Medication Renewal Policy. Medication was last renewed on 9/29/19, last OV 9/10/20.    Anjelica Wen, [facility] Triage/Med Refill 9/23/2020     Requested Prescriptions   Pending Prescriptions Disp Refills     metFORMIN (GLUCOPHAGE-XR) 500 MG 24 hr tablet [Pharmacy Med Name: METFORMIN HCL  MG TABLET] 360 tablet 3     Sig: TAKE 2 TABLETS BY MOUTH 2 TIMES DAILY WITH MEALS       Metformin Refill Protocol Passed - 9/21/2020 12:47 AM        Passed - Blood pressure in last 12 months     BP Readings from Last 1 Encounters:   09/10/20 122/62             Passed - LFT or AST or ALT in last 12 months     Albumin   Date Value Ref Range Status   09/10/2020 3.7 3.5 - 5.0 g/dL Final     Bilirubin, Total   Date Value Ref Range Status   09/10/2020 0.8 0.0 - 1.0 mg/dL Final     Alkaline Phosphatase   Date Value Ref Range Status   09/10/2020 96 45 - 120 U/L Final     AST   Date Value Ref Range Status   09/10/2020 25 0 - 40 U/L Final     ALT   Date Value Ref Range Status   09/10/2020 23 0 - 45 U/L Final     Protein, Total   Date Value Ref Range Status   09/10/2020 6.7 6.0 - 8.0 g/dL Final                Passed - GFR or Serum Creatinine in last 6 months     GFR MDRD Non Af Amer   Date Value Ref Range Status   09/10/2020 >60 >60 mL/min/1.73m2 Final     GFR MDRD Af Amer   Date Value Ref Range Status   09/10/2020 >60 >60 mL/min/1.73m2 Final             Passed - Visit with PCP or prescribing provider visit in last 6 months or next 3 months     Last office visit with prescriber/PCP: Visit date not found OR same dept: Visit date not found OR same specialty: 9/13/2019 Venkata Benson MD Last physical: 9/10/2020 Last MTM visit: Visit date not found         Next appt within 3 mo: Visit date not found  Next physical within 3 mo: Visit date not found  Prescriber OR PCP: Venkata Benson MD  Last diagnosis associated with med order: 1. Type 2 diabetes mellitus without complication, without  long-term current use of insulin (H)  - metFORMIN (GLUCOPHAGE-XR) 500 MG 24 hr tablet [Pharmacy Med Name: METFORMIN HCL  MG TABLET]; TAKE 2 TABLETS BY MOUTH 2 TIMES DAILY WITH MEALS  Dispense: 360 tablet; Refill: 3     If protocol passes may refill for 12 months if within 3 months of last provider visit (or a total of 15 months).           Passed - A1C in last 6 months     Hemoglobin A1c   Date Value Ref Range Status   09/10/2020 5.6 <=5.6 % Final     Comment:     Normal <5.7% Prediabete 5.7-6.4% Diabletes 6.5% or higher - adopted from ADA consensus guidelines               Passed - Microalbumin in last year      Microalbumin, Random Urine   Date Value Ref Range Status   09/10/2020 <0.50 0.00 - 1.99 mg/dL Final

## 2021-06-15 NOTE — PROGRESS NOTES
Hospital Follow-up Visit:    Assessment/Plan:     Multiple rib fractures involving four or more ribs  Doing well. Pain improving.  Discussed need to use incentive spirometry.     - decreased frequency and amount of oxycodone   - discussed return to physical activity.   - incentive spirometry   - continue tylenol.  Start taper as tolerated.     Type 2 diabetes mellitus without complication, without long-term current use of insulin (H)  A1c increased despite increased exercise.  He is planning to focus on diet to reduce his blood sugars.  On statin.  On asa.  BP controlled.  Recheck in 3-6 months.  Add medication?     Subjective:     Alejandro Crow is a 71 y.o. male who presents for a hospital discharge follow up.    Hospital/Nursing Home/IP Rehab Facility: [facility]  Date of Admission: 02/28/2021  Date of Discharge:03/01/2021  Reason(s) for Admission:Multiple rib fractures involving four or more ribs            Do you have any problems taking your medication regularly?  None       Have you had any changes in your medication since discharge? None       Have you had any difficulty following your discharge or treatment plan?  No    Summary of hospitalization:  Hospital discharge summary reviewed  Diagnostic Tests/Treatments reviewed.  Follow up needed: None  Other Healthcare Providers Involved in Patient's Care: none for this episode  Update since discharge: {improved   Information from family, SNF, care coordination: reviewed     Narrative history:   - pain: sleep has been okay.  He can dress himself.  Bending is painful.  Sore and stiff in the morning.   - he has been walking frequently throughout the fall and winter.  Shoulder is better     - mentally, I feel better.   - surprised that he has gained weight.    - he has not tried the IS.     Post Discharge Medication Reconciliation: discharge medications reconciled, continue medications without change - methocarbamol prohibitively expensive.  Plan of care  "communicated with: patient    Objective:     Vitals:    03/04/21 0804   BP: 138/68   Pulse: 64   Resp: 18   Temp: 97.8  F (36.6  C)   TempSrc: Oral   SpO2: 96%   Weight: (!) 251 lb (113.9 kg)   Height: 5' 8.5\" (1.74 m)       Physical Exam:  Physical Exam  Constitutional:       Appearance: He is well-developed.   Cardiovascular:      Rate and Rhythm: Normal rate and regular rhythm.      Heart sounds: Normal heart sounds. No murmur. No friction rub. No gallop.    Pulmonary:      Effort: Pulmonary effort is normal. No respiratory distress.      Breath sounds: Normal breath sounds. No wheezing or rales.   Chest:      Comments: Right chest wall tenderness  Musculoskeletal: Normal range of motion.   Neurological:      Mental Status: He is alert and oriented to person, place, and time.      Cranial Nerves: No cranial nerve deficit.      Deep Tendon Reflexes: Reflexes are normal and symmetric.   Psychiatric:         Behavior: Behavior normal.         Coding guidelines for this visit:  Type of Medical   Decision Making Face-to-Face Visit       within 7 Days of discharge Face-to-Face Visit        within 14 days of discharge   Moderate Complexity [id number]   High Complexity [id number]       Electronically signed by Venkata Benson MD 03/04/21 8:03 AM       "

## 2021-06-15 NOTE — TELEPHONE ENCOUNTER
Reason for Call:  Medication or medication refill:     Do you use a [facility]?  Name of the pharmacy and phone number for the current request: [facility], [place], [phone]    Name of the medication requested:   methocarbamoL (ROBAXIN) 750 MG tablet, but Cub send PA for medication. Might be send to prescribing ER provider.    Other request: Pt was just released from the ER at [facility]. He was prescribed pain management. Raphael sent the patient back a message about methocarbamoL (ROBAXIN) 750 MG tablet needing a PA. He was concerned about the pain management without this medication. Can another medication be called in place?     He is scheduled for ER follow up on 3/4/21    Can we leave a detailed message on this number? Yes    Phone number patient can be reached at:   Cell number on file:    Telephone Information:   Mobile [phone]       Best Time:     Call taken on 3/1/2021 at 3:40 PM by Felisa Lancaster

## 2021-06-15 NOTE — TELEPHONE ENCOUNTER
I called pt back, he said he will use the lidocaine patches and pain medication, if he continues to be in pain he will reach out to Dr. [place] Rosa M Blue LPN

## 2021-06-18 NOTE — PROGRESS NOTES
Assessment/Plan:    Problem List Items Addressed This Visit        ENT/CARD/PULM/ENDO Problems    Type 2 diabetes mellitus without complication, without long-term current use of insulin - Primary     Well controlled per a1c today.   - continue metformin   - continue ASA   - continue statin   - BP well controlled.   - non-smoker.         Essential hypertension     Normotensive.  Recent BMP normal range.  Continue current medication.         Relevant Medications    lisinopril (PRINIVIL,ZESTRIL) 40 MG tablet       Other    Class 2 obesity due to excess calories with serious comorbidity and body mass index (BMI) of 38.0 to 38.9 in adult     Discussed lifestyle as he retires (47 years at ).  Consider liraglutide if struggling with weight or DM.         Relevant Medications    metFORMIN (GLUCOPHAGE-XR) 500 MG 24 hr tablet    Other Relevant Orders    Glycosylated Hemoglobin A1c (Completed)    Metabolic syndrome     Has been present for years but recognized today.  Continue metformin.           Other Visit Diagnoses     Establishing care with new doctor, encounter for            Venkata Benson MD  _______________________________    Chief Complaint   Patient presents with     Establish Care     Subjective: Alejandro Crow is a 68 y.o. year old male who returns to clinic for the following chronic complaints/concerns:     DM:   - planning to lose weight this summer   - recent knee surgery.  Improvement of symptoms subsequently.     - DM has been well controlled in the past few years (was as high as 14.x in early 2016.)  He increased his exercise and was able to get his BG level to target.     - continue exercising.   - He is planning to retire later this month.   - weight fluctuation.  High 230s.   - BG levels: does not check.     - planning to have his other knee done at the end of the year.      History of Tobacco:   - started at age of 16.  Quit at 63 (quite for 13 years in the middle).  Smoked 1/2 to 1 pack for 33  "years.          Review of systems is negative except for as shown in the HPI.    The following portions of the patient's history were reviewed and updated as appropriate: allergies, current medications, past family history, past medical history, past social history, past surgical history and problem list.    Objective:    height is 5' 8\" (1.727 m) and weight is 252 lb (114.3 kg) (abnormal). His oral temperature is 97.8  F (36.6  C). His blood pressure is 122/76 and his pulse is 60. His respiration is 20 and oxygen saturation is 98%.   Gen: nad  Card: rrr, normal s1/s2.  Resp: ctab  Extr: 1+ edema at ankles bilaterally.  Venous statis changes.     Recent Results (from the past 24 hour(s))   Glycosylated Hemoglobin A1c   Result Value Ref Range    Hemoglobin A1c 6.7 (H) 3.5 - 6.0 %     Additional History from Old Records Summarized (2): yes  Decision to Obtain Records (1): no  Radiology Tests Summarized or Ordered (1): no  Labs Reviewed or Ordered (1): yes  Medicine Test Summarized or Ordered (1): no  Independent Review of EKG or X-RAY(2 each): no    This note has been dictated using voice recognition software. Any grammatical or context distortions are unintentional and inherent to the software  "

## 2021-06-19 NOTE — LETTER
Letter by Venkata Benson MD at      Author: Venkata Benson MD Service: -- Author Type: --    Filed:  Encounter Date: 9/16/2019 Status: (Other)         Alejandro Crow  [address]  [place] [address]             September 16, 2019         Dear Mr. Crow,    Below are the results from your recent visit:    Resulted Orders   Glycosylated Hemoglobin A1c   Result Value Ref Range    Hemoglobin A1c 6.2 (H) 4.2 - 6.1 %   Basic Metabolic Panel   Result Value Ref Range    Sodium 141 136 - 145 mmol/L    Potassium 3.9 3.5 - 5.0 mmol/L    Chloride 106 98 - 107 mmol/L    CO2 26 22 - 31 mmol/L    Anion Gap, Calculation 9 5 - 18 mmol/L    Glucose 89 70 - 125 mg/dL    Calcium 9.8 8.5 - 10.5 mg/dL    BUN 17 8 - 28 mg/dL    Creatinine 1.06 0.70 - 1.30 mg/dL    GFR MDRD Af Amer >60 >60 mL/min/1.73m2    GFR MDRD Non Af Amer >60 >60 mL/min/1.73m2    Narrative    Fasting Glucose reference range is 70-99 mg/dL per  American Diabetes Association (ADA) guidelines.   ALT (SGPT)   Result Value Ref Range    ALT 26 0 - 45 U/L       Your numbers are improved.  Great work.  Keep up the efforts at weight loss.    Please call with questions or contact us using [employer].    Sincerely,        Electronically signed by Venkata Benson MD

## 2021-06-19 NOTE — PROGRESS NOTES
"Assessment/Plan:    Alejandro was seen today for edema.    Diagnoses and all orders for this visit:    Olecranon bursitis, left elbow: This patient has what appears to be olecranon bursitis.  Sounds like he had an infection which then resolves with development of a tract to the surface and intermittent drainage which is not present today.  I recommended evaluation by an orthopedist with a clinical question whether or not this will resolve spontaneously and whether or not this is a potential set up for septic arthritis.  Referral placed.  The patient actually will try to walk in when a local orthopedic surgery groups.  -     Ambulatory referral to Orthopedic Surgery     Return for recheck follow-up visit, if not improving.    Venkata Benson MD  _______________________________    Chief Complaint   Patient presents with     Edema     left elbow x 2 months, states he fell and hit his elbow      Subjective: Alejandro Crow is a 68 y.o. year old male who I have seen in clinic before who presents with the following acute complaint(s):    Left elbow drainage:   - injury 2 months ago   - persistent discharge (blood and pus) intermittently.   - he was able to drain the bump and then it would fill up.   - now it has stopped draining.   - he has expressed pus initially.  No clear and water like with tinge of red.    - no pain   - \"not inflamed.\"   - continues to lose weight.  More active.  Wants to get right knee replacement.  Now is going to gym and being active.  Golfing, fishing, yardwork.      ROS: Complete review of systems obtained.  Pertinent items are listed above.     The following portions of the patient's history were reviewed and updated as appropriate: allergies, current medications, past medical history and problem list.     Objective:   /70 (Patient Site: Right Arm, Patient Position: Sitting, Cuff Size: Adult Large)  Pulse 68  Temp 97.9  F (36.6  C) (Oral)   Wt (!) 238 lb (108 kg)  BMI 36.19 kg/m2  Neuro: " No acute distress, pleasant.  MSK: The left olecranon bursa is inflamed.  It is not tender to palpation.  There is a central area of granulation tissue.  When I palpate I am unable to express any type of discharge is serosanguineous or pus.  Active range of motion at the elbow is intact and normal.  Strength is 5/5 in upper extremities bilaterally.    No results found for this or any previous visit (from the past 24 hour(s)).  No results found.    Additional History from Old Records Summarized (2): no  Decision to Obtain Records (1): no  Radiology Tests Summarized or Ordered (1): no  Labs Reviewed or Ordered (1): no  Medicine Test Summarized or Ordered (1): no  Independent Review of EKG or X-RAY(2 each): no    This note has been dictated using voice recognition software. Any grammatical or context distortions are unintentional and inherent to the software

## 2021-06-19 NOTE — LETTER
Letter by Venkata Benson MD at      Author: Venkata Benson MD Service: -- Author Type: --    Filed:  Encounter Date: 5/16/2019 Status: (Other)         Alejandro Crow  [address] [place] [address]             May 16, 2019         Dear Mr. Crow,    Below are the results from your recent visit:    Resulted Orders   Glycosylated Hemoglobin A1c   Result Value Ref Range    Hemoglobin A1c 6.7 (H) 3.5 - 6.0 %   Microalbumin, Random Urine   Result Value Ref Range    Microalbumin, Random Urine 1.26 0.00 - 1.99 mg/dL    Creatinine, Urine 151.8 mg/dL    Microalbumin/Creatinine Ratio Random Urine 8.3 <=19.9 mg/g    Narrative    Microalbumin, Random Urine  <2.0 mg/dL . . . . . . . . Normal  3.0-30.0 mg/dL . . . . . . Microalbuminuria  >30.0 mg/dL . . . . . .  . Clinical Proteinuria  Microalbumin/Creatinine Ratio, Random Urine  <20 mg/g . . . . .. . . . Normal   mg/g . . . . . . . Microalbuminuria  >300 mg/g . . . . . . . . Clinical Proteinuria   Basic Metabolic Panel   Result Value Ref Range    Sodium 145 136 - 145 mmol/L    Potassium 4.6 3.5 - 5.0 mmol/L    Chloride 109 (H) 98 - 107 mmol/L    CO2 24 22 - 31 mmol/L    Anion Gap, Calculation 12 5 - 18 mmol/L    Glucose 139 (H) 70 - 125 mg/dL    Calcium 9.8 8.5 - 10.5 mg/dL    BUN 12 8 - 28 mg/dL    Creatinine 0.98 0.70 - 1.30 mg/dL    GFR MDRD Af Amer >60 >60 mL/min/1.73m2    GFR MDRD Non Af Amer >60 >60 mL/min/1.73m2    Narrative    Fasting Glucose reference range is 70-99 mg/dL per  American Diabetes Association (ADA) guidelines.   ALT (SGPT)   Result Value Ref Range    ALT 32 0 - 45 U/L   AST (SGOT)   Result Value Ref Range    AST 26 0 - 40 U/L   HM2(CBC w/o Differential)   Result Value Ref Range    WBC 4.9 4.0 - 11.0 thou/uL    RBC 5.02 4.40 - 6.20 mill/uL    Hemoglobin 14.0 14.0 - 18.0 g/dL    Hematocrit 42.8 40.0 - 54.0 %    MCV 85 80 - 100 fL    MCH 28.0 27.0 - 34.0 pg    MCHC 32.8 32.0 - 36.0 g/dL    RDW 13.9 11.0 - 14.5 %    Platelets 248 140 -  440 thou/uL    MPV 7.6 7.0 - 10.0 fL       It was great to see you yesterday.  Your labs look okay and are consistent with previous measurements.  Your diabetic control has worsened slightly which I attribute to weight gain and the process of recovering from your knee surgery.  At this time, our conversation regarding diet/lifestyle modification is my recommendation to improve your diabetic control.    Please call with questions or contact us using [employer].    Sincerely,        Electronically signed by Venkata Benson MD

## 2021-06-20 NOTE — LETTER
"Letter by Venkata Benson MD at      Author: Venkata Benson MD Service: -- Author Type: --    Filed:  Encounter Date: 9/15/2020 Status: (Other)         Alejandro Crow  [address] [place] [address]             September 15, 2020         Dear Mr. Crow,    Below are the results from your recent visit:    Resulted Orders   Glycosylated Hemoglobin A1c   Result Value Ref Range    Hemoglobin A1c 5.6 <=5.6 %      Comment:      Normal <5.7% Prediabete 5.7-6.4% Diabletes 6.5% or higher - adopted from ADA consensus guidelines   Microalbumin, Random Urine   Result Value Ref Range    Microalbumin, Random Urine <0.50 0.00 - 1.99 mg/dL    Creatinine, Urine 106.3 mg/dL    Microalbumin/Creatinine Ratio Random Urine        Comment:      \"Unable to calculate: Creatinine and/or Microalbumin value below detectable level\"    Narrative    Microalbumin, Random Urine  <2.0 mg/dL . . . . . . . . Normal  3.0-30.0 mg/dL . . . . . . Microalbuminuria  >30.0 mg/dL . . . . . .  . Clinical Proteinuria    Microalbumin/Creatinine Ratio, Random Urine  <20 mg/g . . . . .. . . . Normal   mg/g . . . . . . . Microalbuminuria  >300 mg/g . . . . . . . . Clinical Proteinuria       Comprehensive Metabolic Panel   Result Value Ref Range    Sodium 141 136 - 145 mmol/L    Potassium 4.4 3.5 - 5.0 mmol/L    Chloride 105 98 - 107 mmol/L    CO2 25 22 - 31 mmol/L    Anion Gap, Calculation 11 5 - 18 mmol/L    Glucose 120 70 - 125 mg/dL    BUN 15 8 - 28 mg/dL    Creatinine 1.04 0.70 - 1.30 mg/dL    GFR MDRD Af Amer >60 >60 mL/min/1.73m2    GFR MDRD Non Af Amer >60 >60 mL/min/1.73m2    Bilirubin, Total 0.8 0.0 - 1.0 mg/dL    Calcium 9.2 8.5 - 10.5 mg/dL    Protein, Total 6.7 6.0 - 8.0 g/dL    Albumin 3.7 3.5 - 5.0 g/dL    Alkaline Phosphatase 96 45 - 120 U/L    AST 25 0 - 40 U/L    ALT 23 0 - 45 U/L    Narrative    Fasting Glucose reference range is 70-99 mg/dL per  American Diabetes Association (ADA) guidelines.   PSA (Prostatic-Specific " Antigen), Annual Screen   Result Value Ref Range    PSA 0.6 0.0 - 6.5 ng/mL    Narrative    Method is Abbott Prostate-Specific Antigen (PSA)  Standard-WHO 1st International (90:10)   LDL Cholesterol, Direct   Result Value Ref Range    Direct LDL 44 <=129 mg/dl     All of your testing looks great.  We should plan to recheck your hemoglobin A1c in approximately 6 months.  The remainder of the test should be rechecked in 1 year.    Please call with questions or contact us using [email or web address].    Sincerely,        Electronically signed by Venkata Benson MD

## 2021-06-20 NOTE — LETTER
Letter by Venkata Benson MD at      Author: Venkata Benson MD Service: -- Author Type: --    Filed:  Encounter Date: 8/11/2020 Status: (Other)         Alejandro Crow   [address]  [place] [address]      8/11/2020       Dear Alejandro,       In reviewing your records, we have determined a gap in your preventive services. Based on your age and health history, we recommend the follow service.     ? General Physical  ? Physical with a Pap Smear  ? Colon cancer screening  ? Mammogram  ? Immunization  ? Diabetic check  ? Blood pressure/cardiovascular check  ? Asthma check  ? Cholesterol test  ? Lab work  ? Med check      If you have had the service elsewhere, please contact us so we can update our records. Please let us know if you have transferred your care to another clinic.    Please call [phone] to schedule this appointment.    We believe that a strong preventive care program, including regular physicals and follow-up care is an important part of a healthy lifestyle and we are committed to helping you maintain your health.    Thank you for choosing us as your health care provider.    Sincerely,     [facility]

## 2021-06-20 NOTE — PROGRESS NOTES
Assessment/Plan:    Problem List Items Addressed This Visit        ENT/CARD/PULM/ENDO Problems    Type 2 diabetes mellitus without complication, without long-term current use of insulin (H) - Primary     Continues to improve his lifestyle and remain active. Today is early for recheck of A1c.  Recheck during pre-operative exam in 2 months.         Essential hypertension     Normotensive.  No side effects.     - BMP at next visit.   - continue lisinopril at 40 mg.            Other    Class 2 obesity due to excess calories with serious comorbidity and body mass index (BMI) of 38.0 to 38.9 in adult     Weight has been stable.  He is working to reduce consumption of high fructose corn syrup.     - continue metformin.     - discuss weight loss progress at his next visit (~2 months)         Metabolic syndrome          No Follow-up on file.    Venkata Benson MD  _______________________________    Chief Complaint   Patient presents with     Follow-up     3 month check up      Subjective: Alejandro Crow is a 68 y.o. year old male who returns to clinic for the following chronic complaints/concerns:     Med check:   - he is happy that he lost weight.   - elbow has improved.   - he has been trying to reduce pop. More water.     - he feels good overall.   - energy has remained high.   - he is not interested in blood draw today.   - no lightheadedness.  No dizziness.   - he walks and golfs regularly.   - knee replacement scheduled for later this year.   - he is tired at the end of the day.  He does not sit down.     - very infrequent lightheadness with standing.      Review of systems is negative except for as shown in the HPI.    The following portions of the patient's history were reviewed and updated as appropriate: allergies, current medications, past medical history and problem list.    Objective:    weight is 236 lb (107 kg) (abnormal). His blood pressure is 126/72 and his pulse is 64.   Gen: nad  Psych: bright  affect  MSK: left elbow is without swelling.    During this encounter, I reviewed his July ortho notes.  No fracture of elbow.  Ortho planning to replace his right knee later this year.     No results found for this or any previous visit (from the past 24 hour(s)).    Additional History from Old Records Summarized (2): yes  Decision to Obtain Records (1): no  Radiology Tests Summarized or Ordered (1): no  Labs Reviewed or Ordered (1): yes  Medicine Test Summarized or Ordered (1): no  Independent Review of EKG or X-RAY(2 each): no    This note has been dictated using voice recognition software. Any grammatical or context distortions are unintentional and inherent to the software

## 2021-06-21 NOTE — PROGRESS NOTES
INTERNAL MEDICINE RESIDENCY PROGRESS NOTE     Name: Zaira Edwards   Age & Sex: 59 y o  male   MRN: [id number]  Unit/Bed#: -01   Encounter: [id number]  Team: SOD Team A    PATIENT INFORMATION     Name: Zaira Edwards   Age & Sex: 59 y o  male   MRN: [id number]  Hospital Stay Days: 2    ASSESSMENT/PLAN     Principal Problem:    Atrial fibrillation with rapid ventricular response ([facility] )  Active Problems:    Systolic CHF ([facility] )    Microcytic anemia    SCARLETT (acute kidney injury) ([facility] )    Gastrointestinal hemorrhage with melena      Gastrointestinal hemorrhage with melena  Assessment & Plan  Patient endorses 1 month history of melena, which is new  He has never had a colonscopy  BP on admission 90s/60s  Hb on admission 9 3, patient symptomatic with light-headedness, SOB and hypotension    Plan:  -transfused 1 unit pRBC  -BUN elevated (37)  -CBC in am  -hold Eliquis    SCARLETT (acute kidney injury) [facility])  Assessment & Plan  Presented with creatinine of 2 31  Last creatinine 0 92-1  Plan:  -likely pre-renal 2/2 blood loss (volume depletion) and hypotension   -transfused 1 unit pRBC  -trend Cr  -hold nephrotoxic agents  -Consider restarting outpatient lasix 60 mg tomorrow if Cr trends back to baseline  Microcytic anemia  Assessment & Plan  Found to have Hb of 9 3 on admission  Last known hemoglobin from two years ago was 13  Patient endorses a one month history of consistently black, tarry stools, which is new for him  Heme-occult in ED was positive  Patient has never had a colonoscopy  Patient also hypotensive on arrival (90/60s)  Plan:  -suspect upper GI bleed as source due to recent hx of melena and being on eliquis    -Suspected anemia as cause of SCARLETT and contributing to uncontrolled a fib RVR  -Received 1 unit of PRBC this admission  -continue to monitor BP and Hb  -Holding home Eliquis for now given ongoing slow GI bleed        Systolic CHF ([facility] 75 )  Assessment & Plan  Pt with hx of systolic CHF with EF 22% Preoperative Exam    Scheduled Procedure: right knee replacement   Surgery Date:  11/06/2018  Surgery Location: [facility]  [phone]    Surgeon:  Dr. Diaz     Assessment/Plan:     Problem List Items Addressed This Visit        ENT/CARD/PULM/ENDO Problems    Type 2 diabetes mellitus without complication, without long-term current use of insulin (H)     The patient describes ongoing attention to diet/lifestyle.  Recheck hemoglobin A1c with blood draw next week.         Relevant Orders    Glycosylated Hemoglobin A1c (Completed)    Essential hypertension     Normotensive.  Continue current medications.  Hold lisinopril pre-surgically.  Check basic metabolic panel within 7 days of surgery (next week).         Relevant Orders    Basic Metabolic Panel (Completed)       Other    Chronic kidney disease, stage 2 (mild)    RESOLVED: Primary osteoarthritis of left knee - Primary      Other Visit Diagnoses     Pre-op exam        Relevant Orders    Hemoglobin (Completed)    Electrocardiogram Perform - Clinic (Completed)        Surgical Procedure Risk: Intermediate (reported cardiac risk generally 1-5%)  Have you had prior anesthesia?: Yes  Have you or any family members had a previous anesthesia reaction:  No  Do you or any family members have a history of a clotting or bleeding disorder?: No  Cardiac Risk Assessment: no increased risk for major cardiac complications    Patient approved for surgery with general or local anesthesia.  No contraindication to surgery.  Able to perform >4 metabolic equivalents.  Potassium will be drawn within 7 days of surgery is requested.    Functional Status: Independent  Patient plans to recover at home with family.     Subjective:      Alejandro Crow is a 68 y.o. male who presents for a preoperative consultation.  Long history of DJD of the knees.  Right knee was replaced earlier this year.  No complications.  He has not done PT. He has done injections in the past which helped  (per Echo 1 year ago)  Compliant with home medication lasix 60 mg, aldoctone 25 mg, entresto 24-26 mg  States he has lost 8 lbs recently, based off of his home scale and the ED scale  However the differences in two difference scale measurements is not necessarily reliable  Patient admits to one month worsening SOB, now with dyspnea with ADL  Baseline sleeps with 3 pillows under his head, and this is unchanged in the last month  Patient denies episodes of lower extremity edema     -2018 stress test showed partially reversible myocardial perfusion defect of basal to mid inferolateral wall   -2018 cardiac cath (s/p NSTEMI type II) showed single vessel CAD with 80% stenosis of the proximal ramus artery  No stent was placed    -2018: Small persistent pericardial effusion present  -2018 TTE: LVEF 50%    Wt Readings from Last 3 Encounters:   08/21/20 99 8 kg (220 lb)   07/27/20 101 kg (222 lb)   03/19/19 102 kg (224 lb)     Plan:  -CXR negative for acute cardiopulmonary disease  -Holding lasix, entresto and aldactone for now given patient's current SCARLETT  Consider restarting once creatinine trends down to baseline   -Cardiology following  Their recommendations appreciated  Continuing with a fib rate control with digoxin and metoprolol (restarted 8/22)  -Patient appears euvolemic  - hypotension likely 2/2 blood loss anemia and current SCARLETT, likely pre-renal  -daily standing weights  -cardiac diet: <2L fluids, <2g Na+, alcohol cessation        * Atrial fibrillation with rapid ventricular response (HCC)  Assessment & Plan  Known history of a fib, being followed by cardiology outpatient  Patient presented to ED today in a fib with RVR (rate 122), despite taking medication today  Patient also found to be hypotensive  Home medications for rate control include digoxin 0 125 mg and metoprolol succinate 100 mg b i d  Follows with Dr Antwon Marques out patient   Had been scheduled for a stress test today outpatient, but came to the ED instead for only short period of time. Xrays have been abnormal. Pain started at least 10+ years ago.  Pain limits walking (golf and fishing).       All other systems reviewed and are negative, other than those listed in the HPI.    Pertinent History  Do you have difficulty breathing or chest pain after walking up a flight of stairs: No  History of obstructive sleep apnea: No  Steroid use in the last 6 months: No  Frequent Aspirin/NSAID use: No  Prior Blood Transfusion: No  Prior Blood Transfusion Reaction: No  If for some reason prior to, during or after the procedure, if it is medically indicated, would you be willing to have a blood transfusion?:  There is no transfusion refusal.    Current Outpatient Prescriptions   Medication Sig Dispense Refill     ascorbic acid, vitamin C, (VITAMIN C) 1000 MG tablet Take 2,000 mg by mouth daily.       aspirin 81 mg chewable tablet Chew 81 mg daily.       atorvastatin (LIPITOR) 80 MG tablet Take 80 mg by mouth at bedtime.        cholecalciferol, vitamin D3, (VITAMIN D3) 4,000 unit cap Take by mouth. 2 tablets oral daily       docosahexanoic acid/epa (FISH OIL ORAL) Take by mouth. 3600mg per day       lisinopril (PRINIVIL,ZESTRIL) 40 MG tablet Take 40 mg by mouth daily.        metFORMIN (GLUCOPHAGE-XR) 500 MG 24 hr tablet Take 2 tablets (1,000 mg total) by mouth 2 (two) times a day. 360 tablet 1     multivitamin therapeutic tablet Take 1 tablet by mouth daily.       No current facility-administered medications for this visit.         No Known Allergies    Patient Active Problem List   Diagnosis     Chronic kidney disease, stage 2 (mild)     Class 2 obesity due to excess calories with serious comorbidity and body mass index (BMI) of 38.0 to 38.9 in adult     Dyslipidemia     History of pulmonary embolism     Psoriasis     Skin lesion of face     Type 2 diabetes mellitus without complication, without long-term current use of insulin (H)     Metabolic syndrome     Essential hypertension  "      Past Medical History:   Diagnosis Date     JUDITH (acute kidney injury) (H)        Past Surgical History:   Procedure Laterality Date     CATARACT EXTRACTION       TOTAL KNEE ARTHROPLASTY         Social History     Social History     Marital status:      Spouse name: N/A     Number of children: N/A     Years of education: N/A     Occupational History     Not on file.     Social History Main Topics     Smoking status: Former Smoker     Smokeless tobacco: Never Used     Alcohol use Yes      Comment: 1 drink every 3-4 months      Drug use: No     Sexual activity: Not Currently     Other Topics Concern     Not on file     Social History Narrative       Patient Care Team:  Venkata Benson MD as PCP - General (Family Medicine)    Objective:     Vitals:    10/26/18 1129   BP: 126/76   Pulse: (!) 56   Resp: 20   Temp: 97.9  F (36.6  C)   TempSrc: Oral   SpO2: 97%   Weight: (!) 246 lb (111.6 kg)   Height: 5' 7.75\" (1.721 m)       Physical Exam:  Physical Exam   Constitutional: He is oriented to person, place, and time. He appears well-developed. No distress.   HENT:   Head: Normocephalic and atraumatic.   Right Ear: External ear normal.   Left Ear: External ear normal.   Nose: Nose normal.   Mouth/Throat: Oropharynx is clear and moist. No oropharyngeal exudate.   Eyes: Conjunctivae and EOM are normal. Pupils are equal, round, and reactive to light. Right eye exhibits no discharge. Left eye exhibits no discharge. No scleral icterus.   Neck: Normal range of motion. No thyromegaly present.   Cardiovascular: Normal rate, regular rhythm, normal heart sounds and intact distal pulses.  Exam reveals no gallop and no friction rub.    No murmur heard.  Pulmonary/Chest: Effort normal and breath sounds normal. No respiratory distress. He has no wheezes.   Abdominal: Soft. He exhibits no distension and no mass. There is no tenderness.   Musculoskeletal: Normal range of motion. He exhibits no edema.   Lymphadenopathy:     He " due to his hypotension (90/60s)  No valvular disease per past cardiology work-up  Plan:  -increased rate likely exacerbated by current blood-loss anemia 2/2 GI bleed (suspect GI malignancy as source)  -Cardizem started in ED to control rate, but then d/c prior to admission because of decreasing BP again  -cardiology following, appreciate their recommendations  -QTc of 459   -Holding home Eliquis for now given ongoing slow GI bleed  -CHADS-VASC score 2  -On tele  - Currently on Toprol  mg BID and digoxin 125 mcg daily  - Cardiology following and recommendations appreciated  - 8/23 - Overnight, heart rate has been controlled, mostly running   Will continue to monitor         Hypotensionresolved as of 8/23/2020  Assessment & Plan  Patient presented from outpatient clinic with hypotension (90s/60s)  Found to have anemia (Hb 9 3) in setting of melena and heme-occult positive test in ED  Last known Hb from two years ago was 13  Plan:  -hypotension 2/2 blood loss from GI bleed (likely GI malignancy)  -1 unit pRBC transfused  -repeat am CBC  -Blood pressure stable (systolics to 567); on rate control Tikosyn  -Holding: lasix, aldactone and entresto given current hypotension and likely pre-renal SCARLETT 2/2 hypotension/volume depletion  Restart when BP and creatinine stable   -holding Eliquis      Disposition: Pending EGD and colonoscopy tomorrow     SUBJECTIVE     Patient seen and examined  No acute events overnight, however last night patient stated that he woke up drenched in sweat  At that time, nurse stated that all vital signs were within normal limits  Otherwise patient has been walking up and down the halls and states that he is no longer getting short of breath  He states that this morning he had 1 black bowel movement - FOBT on this was positive  14 point system ROS reviewed and negative unless stated above      OBJECTIVE     Vitals:    08/23/20 0236 08/23/20 0500 08/23/20 4011 08/23/20 0815   BP: 128/82  127/83 125/83   BP Location:   Left arm    Pulse:   100 97   Resp: 18  20    Temp: 97 8 °F (36 6 °C)  98 °F (36 7 °C)    TempSrc:   Oral    SpO2:   97%    Weight:  95 3 kg (210 lb 1 6 oz)     Height:          Temperature:   Temp (24hrs), Av 4 °F (36 9 °C), Min:97 8 °F (36 6 °C), Max:99 9 °F (37 7 °C)    Temperature: 98 °F (36 7 °C)  Intake & Output:  I/O        07 -  0700 701 -  0700 701 -  0700    P  O  480 480 180    Blood 350      Total Intake(mL/kg) 830 (8 6) 480 (5) 180 (1 9)    Urine (mL/kg/hr) 875 975 (0 4) 250 (0 8)    Total Output 875 975 250    Net -45 -495 -70               Weights:   IBW: 70 7 kg    Body mass index is 31 03 kg/m²  Weight (last 2 days)     Date/Time   Weight    20 0500   95 3 (210 1)    20 0536   96 6 (212 96)    20 0448   96 6 (212 96)    20 1613   99 8 (220)            Physical Exam  Constitutional:       Appearance: Normal appearance  He is obese  HENT:      Head: Normocephalic and atraumatic  Mouth/Throat:      Mouth: Mucous membranes are moist       Pharynx: Oropharynx is clear  Eyes:      Extraocular Movements: Extraocular movements intact  Conjunctiva/sclera: Conjunctivae normal       Pupils: Pupils are equal, round, and reactive to light  Neck:      Musculoskeletal: Normal range of motion and neck supple  Cardiovascular:      Rate and Rhythm: Normal rate  Rhythm irregular  Heart sounds: No murmur  No friction rub  No gallop  Pulmonary:      Effort: Pulmonary effort is normal       Breath sounds: Normal breath sounds  Abdominal:      General: Abdomen is flat  Bowel sounds are normal  There is no distension  Palpations: Abdomen is soft  There is no mass  Tenderness: There is no abdominal tenderness  There is no guarding or rebound  Hernia: No hernia is present  Musculoskeletal: Normal range of motion  Skin:     General: Skin is warm and dry  has no cervical adenopathy.   Neurological: He is alert and oriented to person, place, and time. He has normal reflexes. No cranial nerve deficit. He exhibits normal muscle tone.   Skin: Skin is warm.   Psychiatric: He has a normal mood and affect. His behavior is normal. Judgment and thought content normal.   Vitals reviewed.    There are no Patient Instructions on file for this visit.    EKG:  Sinus bradycardia  No ST changes (my interpretation).  PAC present.    Labs:  Recent Results (from the past 48 hour(s))   Basic Metabolic Panel    Collection Time: 10/30/18  8:05 AM   Result Value Ref Range    Sodium 142 136 - 145 mmol/L    Potassium 4.7 3.5 - 5.0 mmol/L    Chloride 107 98 - 107 mmol/L    CO2 27 22 - 31 mmol/L    Anion Gap, Calculation 8 5 - 18 mmol/L    Glucose 135 (H) 70 - 125 mg/dL    Calcium 9.4 8.5 - 10.5 mg/dL    BUN 17 8 - 28 mg/dL    Creatinine 0.94 0.70 - 1.30 mg/dL    GFR MDRD Af Amer >60 >60 mL/min/1.73m2    GFR MDRD Non Af Amer >60 >60 mL/min/1.73m2   Glycosylated Hemoglobin A1c    Collection Time: 10/30/18  8:05 AM   Result Value Ref Range    Hemoglobin A1c 5.9 3.5 - 6.0 %   Hemoglobin    Collection Time: 10/30/18  8:05 AM   Result Value Ref Range    Hemoglobin 12.7 (L) 14.0 - 18.0 g/dL       Immunization History   Administered Date(s) Administered     Influenza [id number], 12/24/2009     Influenza,seasonal quad, PF, 36+MOS 02/05/2015     Influenza,seasonal, Inj IIV3 11/18/2005, 12/05/2006, 10/10/2017, 11/10/2017     Pneumo Conj 13-V (2010&after) 10/06/2016     Pneumo Polysac 23-V 11/29/2017     Tdap 04/27/2010     ZOSTER, LIVE 11/27/2009           Electronically signed by Venkata Benson MD 10/31/18 11:32 AM     Neurological:      General: No focal deficit present  Mental Status: He is alert and oriented to person, place, and time  Psychiatric:         Mood and Affect: Mood normal          Behavior: Behavior normal        LABORATORY DATA     Labs: I have personally reviewed pertinent reports  Results from last 7 days   Lab Units 08/23/20  0510 08/22/20 2000 08/22/20 0444 08/21/20  1647   WBC Thousand/uL 7 26  --  6 90 8 06   HEMOGLOBIN g/dL 9 8* 9 8* 9 4* 9 3*   HEMATOCRIT % 33 8* 33 0* 32 7* 32 0*   PLATELETS Thousands/uL 374  --  363 457*   NEUTROS PCT % 65  --  56 70   MONOS PCT % 12  --  15* 13*      Results from last 7 days   Lab Units 08/23/20  0510 08/22/20 0444 08/21/20  1647   POTASSIUM mmol/L 4 3 4 0 4 3   CHLORIDE mmol/L 112* 109* 108   CO2 mmol/L 24 25 24   BUN mg/dL 29* 42* 37*   CREATININE mg/dL 1 30 1 96* 2 31*   CALCIUM mg/dL 8 7 8 2* 8 9   ALK PHOS U/L 51 53  --    ALT U/L 22 23  --    AST U/L 8 8  --      Results from last 7 days   Lab Units 08/22/20  0444 08/21/20  1647   MAGNESIUM mg/dL 2 4 2 4     Results from last 7 days   Lab Units 08/21/20  1647   PHOSPHORUS mg/dL 4 1      Results from last 7 days   Lab Units 08/22/20  0815   INR  1 11         Results from last 7 days   Lab Units 08/21/20  1647   TROPONIN I ng/mL 0 10*       IMAGING & DIAGNOSTIC TESTING     Radiology Results: I have personally reviewed pertinent reports  Xr Chest 1 View Portable    Result Date: 8/21/2020  Impression: No acute cardiopulmonary disease  Workstation performed: [id number]     Other Diagnostic Testing: I have personally reviewed pertinent reports      ACTIVE MEDICATIONS     Current Facility-Administered Medications   Medication Dose Route Frequency    allopurinol (ZYLOPRIM) tablet 100 mg  100 mg Oral Daily    atorvastatin (LIPITOR) tablet 40 mg  40 mg Oral Daily With Dinner    bisacodyl (DULCOLAX) EC tablet 20 mg  20 mg Oral Once    digoxin (LANOXIN) tablet 125 mcg  125 mcg Oral Daily    diltiazem (CARDIZEM) injection 10 mg  10 mg Intravenous Q8H PRN    iron sucrose (VENOFER) 200 mg in sodium chloride 0 9 % 100 mL IVPB  200 mg Intravenous Daily    melatonin tablet 3 mg  3 mg Oral HS PRN    metoprolol succinate (TOPROL-XL) 24 hr tablet 100 mg  100 mg Oral BID    polyethylene glycol (GOLYTELY) bowel prep 4,000 mL  4,000 mL Oral Once    spironolactone (ALDACTONE) tablet 12 5 mg  12 5 mg Oral Daily       Portions of the record may have been created with voice recognition software  Occasional wrong word or "sound a like" substitutions may have occurred due to the inherent limitations of voice recognition software    Read the chart carefully and recognize, using context, where substitutions have occurred   ==  Daniel Jones MD  [address]  Internal Medicine Residency PGY-2

## 2021-06-25 NOTE — TELEPHONE ENCOUNTER
Refill Approved    Rx renewed per Medication Renewal Policy. Medication was last renewed on 3/22/2018, last office visit 10/26/2018.    Veena Iyer, [facility] Triage/Med Refill 3/18/2019     Requested Prescriptions   Pending Prescriptions Disp Refills     lisinopril (PRINIVIL,ZESTRIL) 40 MG tablet [Pharmacy Med Name: LISINOPRIL 40 MG TABLET] 90 tablet 3     Sig: TAKE 1 TABLET BY MOUTH ONCE DAILY    Ace Inhibitors Refill Protocol Passed - 3/14/2019  4:07 PM       Passed - PCP or prescribing provider visit in past 12 months      Last office visit with prescriber/PCP: 8/22/2018 Venkata Benson MD OR same dept: 8/22/2018 Venkata Benson MD OR same specialty: 8/22/2018 Venkata Benson MD  Last physical: 10/26/2018 Last MTM visit: Visit date not found   Next visit within 3 mo: Visit date not found  Next physical within 3 mo: Visit date not found  Prescriber OR PCP: Venkata Benson MD  Last diagnosis associated with med order: There are no diagnoses linked to this encounter.  If protocol passes may refill for 12 months if within 3 months of last provider visit (or a total of 15 months).            Passed - Serum Potassium in past 12 months    Lab Results   Component Value Date    Potassium 4.7 10/30/2018            Passed - Blood pressure filed in past 12 months    BP Readings from Last 1 Encounters:   10/26/18 126/76            Passed - Serum Creatinine in past 12 months    Creatinine   Date Value Ref Range Status   10/30/2018 0.94 0.70 - 1.30 mg/dL Final             atorvastatin (LIPITOR) 80 MG tablet [Pharmacy Med Name: ATORVASTATIN 80 MG TABLET] 90 tablet 3     Sig: TAKE 1 TABLET BY MOUTH DAILY    Statins Refill Protocol (Hmg CoA Reductase Inhibitors) Passed - 3/14/2019  4:07 PM       Passed - PCP or prescribing provider visit in past 12 months     Last office visit with prescriber/PCP: 8/22/2018 Venkata Benson MD OR same dept: 8/22/2018 Venkata Benson MD OR same specialty: 8/22/2018   Venkata Wyatt MD  Last physical: 10/26/2018 Last MTM visit: Visit date not found   Next visit within 3 mo: Visit date not found  Next physical within 3 mo: Visit date not found  Prescriber OR PCP: Venkata Benson MD  Last diagnosis associated with med order: There are no diagnoses linked to this encounter.  If protocol passes may refill for 12 months if within 3 months of last provider visit (or a total of 15 months).

## 2021-07-03 NOTE — ADDENDUM NOTE
Addendum Note by Venkata Whipple MD at 9/10/2020  8:20 AM     Author: Venkata Whipple MD Service: -- Author Type: Physician    Filed: 9/11/2020  6:13 AM Encounter Date: 9/10/2020 Status: Signed    : Venkata Whipple MD (Physician)    Addended by: VENKATA WHIPPLE on: 9/11/2020 06:13 AM        Modules accepted: Orders

## 2021-09-12 ENCOUNTER — HEALTH MAINTENANCE LETTER (OUTPATIENT)
Age: 71
End: 2021-09-12

## 2021-09-20 DIAGNOSIS — E11.9 TYPE 2 DIABETES MELLITUS WITHOUT COMPLICATION, WITHOUT LONG-TERM CURRENT USE OF INSULIN (H): ICD-10-CM

## 2021-09-20 NOTE — TELEPHONE ENCOUNTER
"Routing refill request to provider for review/approval because:  Labs out of range:  A1C 6.7 and not seen within past 6 months. No PCP listed.     Disp Refills Start End DON   metFORMIN (GLUCOPHAGE-XR) 500 MG 24 hr tablet 360 tablet 3 9/23/2020  No   Sig: TAKE 2 TABLETS BY MOUTH 2 TIMES DAILY WITH MEALS   Sent to pharmacy as: metFORMIN  mg tablet,extended release 24 hr   E-Prescribing Status: Receipt confirmed by pharmacy (9/23/2020  7:18 AM CDT)         Last Written Prescription Date:  05/18/20  Last Fill Quantity:360 ,  # refills: 3   Last office visit provider:  03/04/2021     Requested Prescriptions   Pending Prescriptions Disp Refills     metFORMIN (GLUCOPHAGE-XR) 500 MG 24 hr tablet [Pharmacy Med Name: METFORMIN HCL  MG TABLET] 360 tablet 3     Sig: TAKE 2 TABLETS BY MOUTH 2 TIMES DAILY WITH MEALS       Biguanide Agents Failed - 9/20/2021 12:31 AM        Failed - Patient has documented A1c within the specified period of time.     If HgbA1C is 8 or greater, it needs to be on file within the past 3 months.  If less than 8, must be on file within the past 6 months.     Recent Labs   Lab Test 03/04/21  0855   A1C 6.7*             Failed - Recent (6 mo) or future (30 days) visit within the authorizing provider's specialty     Patient had office visit in the last 6 months or has a visit in the next 30 days with authorizing provider or within the authorizing provider's specialty.  See \"Patient Info\" tab in inbasket, or \"Choose Columns\" in Meds & Orders section of the refill encounter.            Passed - Patient is age 10 or older        Passed - Patient's CR is NOT>1.4 OR Patient's EGFR is NOT<45 within past 12 mos.     Recent Labs   Lab Test 02/28/21  1541   GFRESTIMATED >60   GFRESTBLACK >60       Recent Labs   Lab Test 02/28/21  1541   CR 1.04             Passed - Patient does NOT have a diagnosis of CHF.        Passed - Medication is active on med list             Celeste Pisano RN 09/20/21 9:05 " AM

## 2021-09-23 DIAGNOSIS — E78.5 DYSLIPIDEMIA: ICD-10-CM

## 2021-09-23 DIAGNOSIS — I10 BENIGN ESSENTIAL HYPERTENSION: ICD-10-CM

## 2021-09-23 RX ORDER — LISINOPRIL 40 MG/1
40 TABLET ORAL DAILY
Qty: 90 TABLET | Refills: 1 | Status: SHIPPED | OUTPATIENT
Start: 2021-09-23 | End: 2022-03-25

## 2021-09-23 NOTE — TELEPHONE ENCOUNTER
"  Disp Refills Start End DON    lisinopriL (PRINIVIL,ZESTRIL) 40 MG tablet 90 tablet 3 9/14/2020  No   Sig: TAKE 1 TABLET BY MOUTH EVERY DAY   Sent to pharmacy as: lisinopriL 40 mg tablet (PRINIVIL,ZESTRIL)   E-Prescribing Status: Receipt confirmed by pharmacy (9/14/2020  2:13 PM CDT)       Last Written Prescription Date:  9/14/20  Last Fill Quantity: 90,  # refills: 3   Last office visit provider:  3/4/21     Requested Prescriptions   Pending Prescriptions Disp Refills     lisinopril (ZESTRIL) 40 MG tablet [Pharmacy Med Name: LISINOPRIL 40 MG TABLET] 90 tablet 3     Sig: TAKE 1 TABLET BY MOUTH EVERY DAY       ACE Inhibitors (Including Combos) Protocol Passed - 9/23/2021 12:31 AM        Passed - Blood pressure under 140/90 in past 12 months     BP Readings from Last 3 Encounters:   03/04/21 138/68   09/10/20 122/62   09/13/19 128/80                 Passed - Recent (12 mo) or future (30 days) visit within the authorizing provider's specialty     Patient has had an office visit with the authorizing provider or a provider within the authorizing providers department within the previous 12 mos or has a future within next 30 days. See \"Patient Info\" tab in inbasket, or \"Choose Columns\" in Meds & Orders section of the refill encounter.              Passed - Medication is active on med list        Passed - Patient is age 18 or older        Passed - Normal serum creatinine on file in past 12 months     Recent Labs   Lab Test 02/28/21  1541   CR 1.04       Ok to refill medication if creatinine is low          Passed - Normal serum potassium on file in past 12 months     Recent Labs   Lab Test 02/28/21  1541   POTASSIUM 4.5                atorvastatin (LIPITOR) 80 MG tablet [Pharmacy Med Name: ATORVASTATIN 80 MG TABLET] 90 tablet 3     Sig: TAKE 1 TABLET BY MOUTH EVERY DAY       Statins Protocol Failed - 9/23/2021 12:31 AM        Failed - LDL on file in past 12 months     Recent Labs   Lab Test 09/10/20  0840   LDL 44         " "    Passed - No abnormal creatine kinase in past 12 months     No lab results found.             Passed - Recent (12 mo) or future (30 days) visit within the authorizing provider's specialty     Patient has had an office visit with the authorizing provider or a provider within the authorizing providers department within the previous 12 mos or has a future within next 30 days. See \"Patient Info\" tab in inbasket, or \"Choose Columns\" in Meds & Orders section of the refill encounter.              Passed - Medication is active on med list        Passed - Patient is age 18 or older             Hugo Carvalho RN 09/23/21 2:25 PM  "

## 2021-09-23 NOTE — TELEPHONE ENCOUNTER
"  Disp Refills Start End DON    atorvastatin (LIPITOR) 80 MG tablet     --   Sig - Route: Take 80 mg by mouth at bedtime. - Oral   Class: Historical Med       atorvastatin (LIPITOR) 80 MG tablet [[id number]]  Patient-reported historical medication  Ordering date: 02/28/21 1555 Authorized by: PROVIDER, HISTORICAL   Frequency: QHS  - Until Discontinued       Routing refill request to provider for review/approval because:  Labs not current:  LDL  Medication is reported/historical    Last Written Prescription Date:  ???  Last Fill Quantity: ???,  # refills: ???   Last office visit provider:  3/4/21     Requested Prescriptions   Pending Prescriptions Disp Refills     atorvastatin (LIPITOR) 80 MG tablet [Pharmacy Med Name: ATORVASTATIN 80 MG TABLET] 90 tablet 3     Sig: TAKE 1 TABLET BY MOUTH EVERY DAY       Statins Protocol Failed - 9/23/2021 12:31 AM        Failed - LDL on file in past 12 months     Recent Labs   Lab Test 09/10/20  0840   LDL 44             Passed - No abnormal creatine kinase in past 12 months     No lab results found.             Passed - Recent (12 mo) or future (30 days) visit within the authorizing provider's specialty     Patient has had an office visit with the authorizing provider or a provider within the authorizing providers department within the previous 12 mos or has a future within next 30 days. See \"Patient Info\" tab in inbasket, or \"Choose Columns\" in Meds & Orders section of the refill encounter.              Passed - Medication is active on med list        Passed - Patient is age 18 or older         Signed Prescriptions Disp Refills    lisinopril (ZESTRIL) 40 MG tablet 90 tablet 1     Sig: Take 1 tablet (40 mg) by mouth daily       ACE Inhibitors (Including Combos) Protocol Passed - 9/23/2021 12:31 AM        Passed - Blood pressure under 140/90 in past 12 months     BP Readings from Last 3 Encounters:   03/04/21 138/68   09/10/20 122/62   09/13/19 128/80                 Passed - Recent " "(12 mo) or future (30 days) visit within the authorizing provider's specialty     Patient has had an office visit with the authorizing provider or a provider within the authorizing providers department within the previous 12 mos or has a future within next 30 days. See \"Patient Info\" tab in inbasket, or \"Choose Columns\" in Meds & Orders section of the refill encounter.              Passed - Medication is active on med list        Passed - Patient is age 18 or older        Passed - Normal serum creatinine on file in past 12 months     Recent Labs   Lab Test 02/28/21  1541   CR 1.04       Ok to refill medication if creatinine is low          Passed - Normal serum potassium on file in past 12 months     Recent Labs   Lab Test 02/28/21  1541   POTASSIUM 4.5                  Hugo Carvalho RN 09/23/21 2:26 PM  "

## 2021-09-24 RX ORDER — ATORVASTATIN CALCIUM 80 MG/1
TABLET, FILM COATED ORAL
Qty: 90 TABLET | Refills: 3 | Status: SHIPPED | OUTPATIENT
Start: 2021-09-24 | End: 2022-09-26

## 2021-10-15 RX ORDER — METFORMIN HCL 500 MG
TABLET, EXTENDED RELEASE 24 HR ORAL
Qty: 360 TABLET | Refills: 3 | Status: SHIPPED | OUTPATIENT
Start: 2021-10-15 | End: 2021-11-01

## 2021-10-15 NOTE — TELEPHONE ENCOUNTER
Please see refill medication request below, last seen by Dr. Benson on 03/04/2021. No current PCP listed - Please advise on refill.     Celeste Pisano RN  [facility] Nurse Advisor  12:32 PM 10/15/2021

## 2021-10-18 DIAGNOSIS — E11.9 TYPE 2 DIABETES MELLITUS WITHOUT COMPLICATION, WITHOUT LONG-TERM CURRENT USE OF INSULIN (H): ICD-10-CM

## 2021-10-19 RX ORDER — METFORMIN HCL 500 MG
TABLET, EXTENDED RELEASE 24 HR ORAL
Qty: 360 TABLET | Refills: 3 | OUTPATIENT
Start: 2021-10-19

## 2021-10-19 NOTE — TELEPHONE ENCOUNTER
"Routing refill request to provider for review/approval because:  DUPLICATE ORDER. PCP already refilled medication.     Last Written Prescription Date:  10/15/2021  Last Fill Quantity: 360,  # refills: 3   Last office visit provider:  3/4/2021     Requested Prescriptions   Pending Prescriptions Disp Refills     metFORMIN (GLUCOPHAGE-XR) 500 MG 24 hr tablet [Pharmacy Med Name: METFORMIN HCL  MG TABLET] 360 tablet 3     Sig: TAKE 2 TABLETS BY MOUTH 2 TIMES DAILY WITH MEALS       Biguanide Agents Failed - 10/18/2021  4:46 PM        Failed - Patient has documented A1c within the specified period of time.     If HgbA1C is 8 or greater, it needs to be on file within the past 3 months.  If less than 8, must be on file within the past 6 months.     Recent Labs   Lab Test 03/04/21  0855   A1C 6.7*             Failed - Recent (6 mo) or future (30 days) visit within the authorizing provider's specialty     Patient had office visit in the last 6 months or has a visit in the next 30 days with authorizing provider or within the authorizing provider's specialty.  See \"Patient Info\" tab in inbasket, or \"Choose Columns\" in Meds & Orders section of the refill encounter.            Passed - Patient is age 10 or older        Passed - Patient's CR is NOT>1.4 OR Patient's EGFR is NOT<45 within past 12 mos.     Recent Labs   Lab Test 02/28/21  1541   GFRESTIMATED >60   GFRESTBLACK >60       Recent Labs   Lab Test 02/28/21  1541   CR 1.04             Passed - Patient does NOT have a diagnosis of CHF.        Passed - Medication is active on med list             Bouchra Jiménez RN 10/19/21 4:14 PM  "

## 2021-10-28 ENCOUNTER — TRANSFERRED RECORDS (OUTPATIENT)
Dept: HEALTH INFORMATION MANAGEMENT | Facility: CLINIC | Age: 71
End: 2021-10-28
Payer: COMMERCIAL

## 2021-10-28 LAB
RETINOPATHY: NEGATIVE
RETINOPATHY: NORMAL

## 2021-11-01 ENCOUNTER — OFFICE VISIT (OUTPATIENT)
Dept: FAMILY MEDICINE | Facility: CLINIC | Age: 71
End: 2021-11-01
Payer: COMMERCIAL

## 2021-11-01 VITALS
HEART RATE: 50 BPM | RESPIRATION RATE: 16 BRPM | WEIGHT: 244 LBS | OXYGEN SATURATION: 97 % | DIASTOLIC BLOOD PRESSURE: 80 MMHG | SYSTOLIC BLOOD PRESSURE: 136 MMHG | TEMPERATURE: 98.2 F | HEIGHT: 69 IN | BODY MASS INDEX: 36.14 KG/M2

## 2021-11-01 DIAGNOSIS — N18.2 CHRONIC KIDNEY DISEASE, STAGE 2 (MILD): Primary | ICD-10-CM

## 2021-11-01 DIAGNOSIS — Z00.00 ROUTINE GENERAL MEDICAL EXAMINATION AT A HEALTH CARE FACILITY: ICD-10-CM

## 2021-11-01 DIAGNOSIS — E11.9 TYPE 2 DIABETES MELLITUS WITHOUT COMPLICATION, WITHOUT LONG-TERM CURRENT USE OF INSULIN (H): ICD-10-CM

## 2021-11-01 DIAGNOSIS — I10 ESSENTIAL HYPERTENSION: ICD-10-CM

## 2021-11-01 PROBLEM — D12.3 BENIGN NEOPLASM OF TRANSVERSE COLON: Status: ACTIVE | Noted: 2020-09-04

## 2021-11-01 PROBLEM — E88.810 METABOLIC SYNDROME: Status: ACTIVE | Noted: 2018-05-16

## 2021-11-01 LAB
CREAT UR-MCNC: 104 MG/DL
HBA1C MFR BLD: 6.1 % (ref 0–5.6)
MICROALBUMIN UR-MCNC: 0.71 MG/DL (ref 0–1.99)
MICROALBUMIN/CREAT UR: 6.8 MG/G CR

## 2021-11-01 PROCEDURE — 83036 HEMOGLOBIN GLYCOSYLATED A1C: CPT | Performed by: FAMILY MEDICINE

## 2021-11-01 PROCEDURE — 36415 COLL VENOUS BLD VENIPUNCTURE: CPT | Performed by: FAMILY MEDICINE

## 2021-11-01 PROCEDURE — 90471 IMMUNIZATION ADMIN: CPT | Performed by: FAMILY MEDICINE

## 2021-11-01 PROCEDURE — 90662 IIV NO PRSV INCREASED AG IM: CPT | Performed by: FAMILY MEDICINE

## 2021-11-01 PROCEDURE — 99214 OFFICE O/P EST MOD 30 MIN: CPT | Mod: 25 | Performed by: FAMILY MEDICINE

## 2021-11-01 PROCEDURE — 82043 UR ALBUMIN QUANTITATIVE: CPT | Performed by: FAMILY MEDICINE

## 2021-11-01 RX ORDER — METFORMIN HCL 500 MG
TABLET, EXTENDED RELEASE 24 HR ORAL
Qty: 360 TABLET | Refills: 3 | Status: SHIPPED | OUTPATIENT
Start: 2021-11-01 | End: 2022-11-07

## 2021-11-01 ASSESSMENT — MIFFLIN-ST. JEOR: SCORE: 1834.22

## 2021-11-01 NOTE — ASSESSMENT & PLAN NOTE
This patient's diabetes is controlled.  Improved.  Active during the summer.    -Hemoglobin A1c: controlled   -blood pressure: controlled   -statin: Yes   -aspirin: YES   -tobacco status: No    Current Medications:   - no change. Metformin.

## 2021-11-01 NOTE — LETTER
November 3, 2021      Alejandro Crow  [address] [place] [address]        Dear ,    We are writing to inform you of your test results.    Testing is stable.  I will see you in the spring.    Resulted Orders   Hemoglobin A1c (aka HBA1C)   Result Value Ref Range    Hemoglobin A1C 6.1 (H) 0.0 - 5.6 %      Comment:      Normal <5.7%   Prediabetes 5.7-6.4%    Diabetes 6.5% or higher     Note: Adopted from ADA consensus guidelines.   Albumin Random Urine Quantitative with Creat Ratio   Result Value Ref Range    Microalbumin Urine mg/dL 0.71 0.00 - 1.99 mg/dL    Creatinine Urine mg/dL 104 mg/dL    Microalbumin Urine mg/g Cr 6.8 <=19.9 mg/g Cr    Narrative    Microalbumin, Random Urine   <2.0 mg/dL . . . . . . . . Normal   3.0-30.0 mg/dL . . . . . . Microalbuminuria   >30.0 mg/dL . . . . . .  . Clinical Proteinuria     Microalbumin/Creatinine Ratio, Random Urine   <20 mg/g . . . . .. . . . Normal    mg/g . . . . . . . Microalbuminuria   >300 mg/g . . . . . . . . Clinical Proteinuria       If you have any questions or concerns, please call the clinic at the number listed above.       Sincerely,      Venkata Benson MD

## 2021-11-01 NOTE — PROGRESS NOTES
"Assessment/Plan:    Type 2 diabetes mellitus without complication, without long-term current use of insulin (H)  This patient's diabetes is controlled.  Improved.  Active during the summer.    -Hemoglobin A1c: controlled   -blood pressure: controlled   -statin: Yes   -aspirin: YES   -tobacco status: No    Current Medications:   - no change. Metformin.       Essential hypertension  Blood pressure control stable.  Check BMP.  Continue current regimen.      Return in about 6 months (around 5/1/2022) for Annual exam, Diabetes focused visit.    Venkata Benson MD  _______________________________    Chief Complaint   Patient presents with     Clinic Care Coordination - Follow-up     diabetes-pt is not fasting      Subjective: Alejandro Crow is a 71 year old year old male who returns to clinic for the following chronic complaints/concerns:     DM / BP:   - doing well.  Ribs healed. Golfing and fishing. \"Life is good.\"   - \"Gratitude.\"     - grandchildren live nearby.    - recent URI.  Resolved.  Received COVID-19 vaccination.   - no hypoglycemic episodes.  No lightheadedness.  No dizziness.  Feels well   - in process of moving to [place].      Review of Systems   Constitutional:        Review of systems negative except as noted in the HPI.   All other systems reviewed and are negative.     Reviewed history:    Problems  Med Hx  Surg Hx           Objective:    height is 1.74 m (5' 8.5\") and weight is 110.7 kg (244 lb). His oral temperature is 98.2  F (36.8  C). His blood pressure is 136/80 and his pulse is 50. His respiration is 16 and oxygen saturation is 97%.   Physical Exam  Nursing note reviewed.   Constitutional:       General: He is not in acute distress.     Appearance: Normal appearance. He is not ill-appearing.   HENT:      Head: Normocephalic and atraumatic.   Eyes:      Extraocular Movements: Extraocular movements intact.      Conjunctiva/sclera: Conjunctivae normal.   Pulmonary:      Effort: Pulmonary " effort is normal.   Neurological:      Mental Status: He is alert and oriented to person, place, and time.   Psychiatric:         Attention and Perception: Attention normal.         Mood and Affect: Mood normal.         Speech: Speech normal.         Thought Content: Thought content normal.       A1c today: 6.1%    No flowsheet data found.  No flowsheet data found.  No flowsheet data found.  No flowsheet data found.  Recent Results (from the past 48 hour(s))   Hemoglobin A1c (aka HBA1C)    Collection Time: 11/01/21  8:42 AM   Result Value Ref Range    Hemoglobin A1C 6.1 (H) 0.0 - 5.6 %     No results found for this visit on 11/01/21.    This note has been dictated using voice recognition software. Any grammatical or context distortions are unintentional and inherent to the software

## 2021-11-15 ENCOUNTER — IMMUNIZATION (OUTPATIENT)
Dept: NURSING | Facility: CLINIC | Age: 71
End: 2021-11-15
Payer: COMMERCIAL

## 2021-11-15 PROCEDURE — 91306 COVID-19,PF,MODERNA (18+ YRS BOOSTER .25ML): CPT

## 2021-11-15 PROCEDURE — 0064A COVID-19,PF,MODERNA (18+ YRS BOOSTER .25ML): CPT

## 2022-02-06 NOTE — LETTER
"February 27, 2017      Alejandro Crow  [address]  [place] [address]        Dear Alejandro,    Your kidneys are great - no sign of any diabetic kidney disease. Thanks for coming to clinic last week.     Please see below for your test results.    Resulted Orders   Hemoglobin A1c ([facility] )   Result Value Ref Range    Hemoglobin A1C 6.7 (H) 4.1 - 5.7 %   Microalbumin Random Ur ([facility])   Result Value Ref Range    Microalbumin, Urine <0.50 0.00 - 1.99 mg/dL    Creatinine, Urine 73.8 mg/dL    Albumin Urine mg/g Cr See Note. <=19.9 mg/g      Comment:      \"Unable to calculate: Creatinine and/or Microalbumin value below detectable   level\"      Narrative    Test performed by:  [facility]  [address] [place] [address]  Microalbumin, Random Urine  <2.0 mg/dL . . . . . . . . Normal  3.0-30.0 mg/dL . . . . . . Microalbuminuria  >30.0 mg/dL . . . . . .  . Clinical Proteinuria  Microalbumin/Creatinine Ratio, Random Urine  <20 mg/g . . . . .. . . . Normal   mg/g . . . . . . . Microalbuminuria  >300 mg/g . . . . . . . . Clinical Proteinuria       If you have any questions, please call the clinic to make an appointment.    Sincerely,    Deshawn Dowell MD  "
Yes - the patient is able to be screened

## 2022-03-24 DIAGNOSIS — I10 BENIGN ESSENTIAL HYPERTENSION: ICD-10-CM

## 2022-03-25 RX ORDER — LISINOPRIL 40 MG/1
TABLET ORAL
Qty: 90 TABLET | Refills: 1 | Status: SHIPPED | OUTPATIENT
Start: 2022-03-25 | End: 2022-09-26

## 2022-03-25 NOTE — TELEPHONE ENCOUNTER
"Routing refill request to provider for review/approval because:  Labs not current:  Serum creatinine, potassium    Last Written Prescription Date:  09/23/2021  Last Fill Quantity: 90,  # refills: 1   Last office visit provider:   11/01/2021 with Dr Benson.    Requested Prescriptions   Pending Prescriptions Disp Refills     lisinopril (ZESTRIL) 40 MG tablet [Pharmacy Med Name: LISINOPRIL 40 MG TABLET] 90 tablet 1     Sig: TAKE 1 TABLET BY MOUTH EVERY DAY       ACE Inhibitors (Including Combos) Protocol Failed - 3/24/2022  1:45 AM        Failed - Normal serum creatinine on file in past 12 months     Recent Labs   Lab Test 02/28/21  1541   CR 1.04       Ok to refill medication if creatinine is low          Failed - Normal serum potassium on file in past 12 months     Recent Labs   Lab Test 02/28/21  1541   POTASSIUM 4.5             Passed - Blood pressure under 140/90 in past 12 months     BP Readings from Last 3 Encounters:   11/01/21 136/80   03/04/21 138/68   09/10/20 122/62                 Passed - Recent (12 mo) or future (30 days) visit within the authorizing provider's specialty     Patient has had an office visit with the authorizing provider or a provider within the authorizing providers department within the previous 12 mos or has a future within next 30 days. See \"Patient Info\" tab in inbasket, or \"Choose Columns\" in Meds & Orders section of the refill encounter.              Passed - Medication is active on med list        Passed - Patient is age 18 or older             Fatmata Meyers 03/25/22 9:15 AM  "

## 2022-09-24 DIAGNOSIS — I10 BENIGN ESSENTIAL HYPERTENSION: ICD-10-CM

## 2022-09-24 DIAGNOSIS — E78.5 DYSLIPIDEMIA: ICD-10-CM

## 2022-09-25 NOTE — TELEPHONE ENCOUNTER
"Routing refill request to provider for review/approval because:  Labs not current:  Cr k ldl    Last Written Prescription Date:  9/24/21  Last Fill Quantity: 90,  # refills: 3   Last office visit provider:  11/1/21     Requested Prescriptions   Pending Prescriptions Disp Refills     lisinopril (ZESTRIL) 40 MG tablet [Pharmacy Med Name: LISINOPRIL 40 MG TABLET] 90 tablet 1     Sig: TAKE 1 TABLET BY MOUTH EVERY DAY       ACE Inhibitors (Including Combos) Protocol Failed - 9/24/2022  8:17 AM        Failed - Normal serum creatinine on file in past 12 months     Recent Labs   Lab Test 02/28/21  1541   CR 1.04       Ok to refill medication if creatinine is low          Failed - Normal serum potassium on file in past 12 months     Recent Labs   Lab Test 02/28/21  1541   POTASSIUM 4.5             Passed - Blood pressure under 140/90 in past 12 months     BP Readings from Last 3 Encounters:   11/01/21 136/80   03/04/21 138/68   09/10/20 122/62                 Passed - Recent (12 mo) or future (30 days) visit within the authorizing provider's specialty     Patient has had an office visit with the authorizing provider or a provider within the authorizing providers department within the previous 12 mos or has a future within next 30 days. See \"Patient Info\" tab in inbasket, or \"Choose Columns\" in Meds & Orders section of the refill encounter.              Passed - Medication is active on med list        Passed - Patient is age 18 or older           atorvastatin (LIPITOR) 80 MG tablet [Pharmacy Med Name: ATORVASTATIN 80 MG TABLET] 90 tablet 3     Sig: TAKE 1 TABLET BY MOUTH EVERY DAY       Statins Protocol Failed - 9/24/2022  8:17 AM        Failed - LDL on file in past 12 months     Recent Labs   Lab Test 09/10/20  0840   LDL 44             Passed - No abnormal creatine kinase in past 12 months     No lab results found.             Passed - Recent (12 mo) or future (30 days) visit within the authorizing provider's specialty     " "Patient has had an office visit with the authorizing provider or a provider within the authorizing providers department within the previous 12 mos or has a future within next 30 days. See \"Patient Info\" tab in inbasket, or \"Choose Columns\" in Meds & Orders section of the refill encounter.              Passed - Medication is active on med list        Passed - Patient is age 18 or older             Bailey Garcia RN 09/25/22 11:06 AM  "

## 2022-09-26 RX ORDER — ATORVASTATIN CALCIUM 80 MG/1
TABLET, FILM COATED ORAL
Qty: 90 TABLET | Refills: 3 | Status: SHIPPED | OUTPATIENT
Start: 2022-09-26 | End: 2022-11-09

## 2022-09-26 RX ORDER — LISINOPRIL 40 MG/1
TABLET ORAL
Qty: 90 TABLET | Refills: 1 | Status: SHIPPED | OUTPATIENT
Start: 2022-09-26 | End: 2022-11-09

## 2022-11-05 DIAGNOSIS — E11.9 TYPE 2 DIABETES MELLITUS WITHOUT COMPLICATION, WITHOUT LONG-TERM CURRENT USE OF INSULIN (H): ICD-10-CM

## 2022-11-07 RX ORDER — METFORMIN HCL 500 MG
TABLET, EXTENDED RELEASE 24 HR ORAL
Qty: 360 TABLET | Refills: 3 | Status: SHIPPED | OUTPATIENT
Start: 2022-11-07 | End: 2023-11-09

## 2022-11-07 NOTE — TELEPHONE ENCOUNTER
"Routing refill request to provider for review/approval because:  Labs not current:  a1c  cr    Last Written Prescription Date:  11/1/21  Last Fill Quantity: 360,  # refills: 3   Last office visit provider:  11/1/21     Requested Prescriptions   Pending Prescriptions Disp Refills     metFORMIN (GLUCOPHAGE XR) 500 MG 24 hr tablet [Pharmacy Med Name: METFORMIN HCL  MG TABLET] 360 tablet 3     Sig: TAKE 2 TABLETS BY MOUTH TWICE A DAY WITH MEALS       Biguanide Agents Failed - 11/5/2022 10:15 AM        Failed - Patient has documented A1c within the specified period of time.     If HgbA1C is 8 or greater, it needs to be on file within the past 3 months.  If less than 8, must be on file within the past 6 months.     Recent Labs   Lab Test 11/01/21  0842   A1C 6.1*             Failed - Patient's CR is NOT>1.4 OR Patient's EGFR is NOT<45 within past 12 mos.     Recent Labs   Lab Test 02/28/21  1541   GFRESTIMATED >60   GFRESTBLACK >60       Recent Labs   Lab Test 02/28/21  1541   CR 1.04             Passed - Patient is age 10 or older        Passed - Patient does NOT have a diagnosis of CHF.        Passed - Medication is active on med list        Passed - Recent (6 mo) or future (30 days) visit within the authorizing provider's specialty     Patient had office visit in the last 6 months or has a visit in the next 30 days with authorizing provider or within the authorizing provider's specialty.  See \"Patient Info\" tab in inbasket, or \"Choose Columns\" in Meds & Orders section of the refill encounter.                 Bailey Garcia RN 11/07/22 1:29 PM  "

## 2022-11-09 ENCOUNTER — OFFICE VISIT (OUTPATIENT)
Dept: FAMILY MEDICINE | Facility: CLINIC | Age: 72
End: 2022-11-09
Payer: COMMERCIAL

## 2022-11-09 VITALS
WEIGHT: 232.6 LBS | OXYGEN SATURATION: 100 % | DIASTOLIC BLOOD PRESSURE: 82 MMHG | HEART RATE: 54 BPM | HEIGHT: 69 IN | SYSTOLIC BLOOD PRESSURE: 138 MMHG | BODY MASS INDEX: 34.45 KG/M2

## 2022-11-09 DIAGNOSIS — E88.810 METABOLIC SYNDROME: ICD-10-CM

## 2022-11-09 DIAGNOSIS — E11.9 TYPE 2 DIABETES MELLITUS WITHOUT COMPLICATION, WITHOUT LONG-TERM CURRENT USE OF INSULIN (H): Primary | ICD-10-CM

## 2022-11-09 DIAGNOSIS — E66.09 CLASS 1 OBESITY DUE TO EXCESS CALORIES WITH SERIOUS COMORBIDITY AND BODY MASS INDEX (BMI) OF 34.0 TO 34.9 IN ADULT: ICD-10-CM

## 2022-11-09 DIAGNOSIS — N18.2 CHRONIC KIDNEY DISEASE, STAGE 2 (MILD): ICD-10-CM

## 2022-11-09 DIAGNOSIS — Z13.220 SCREENING FOR HYPERLIPIDEMIA: ICD-10-CM

## 2022-11-09 DIAGNOSIS — E66.811 CLASS 1 OBESITY DUE TO EXCESS CALORIES WITH SERIOUS COMORBIDITY AND BODY MASS INDEX (BMI) OF 34.0 TO 34.9 IN ADULT: ICD-10-CM

## 2022-11-09 DIAGNOSIS — I10 BENIGN ESSENTIAL HYPERTENSION: ICD-10-CM

## 2022-11-09 DIAGNOSIS — E78.5 DYSLIPIDEMIA: ICD-10-CM

## 2022-11-09 DIAGNOSIS — I10 ESSENTIAL HYPERTENSION: ICD-10-CM

## 2022-11-09 DIAGNOSIS — Z86.711 HISTORY OF PULMONARY EMBOLISM: ICD-10-CM

## 2022-11-09 PROBLEM — M17.12 PRIMARY OSTEOARTHRITIS OF LEFT KNEE: Status: RESOLVED | Noted: 2018-01-18 | Resolved: 2022-11-09

## 2022-11-09 LAB
ALT SERPL W P-5'-P-CCNC: 31 U/L (ref 10–50)
ANION GAP SERPL CALCULATED.3IONS-SCNC: 10 MMOL/L (ref 7–15)
BUN SERPL-MCNC: 14.5 MG/DL (ref 8–23)
CALCIUM SERPL-MCNC: 9.8 MG/DL (ref 8.8–10.2)
CHLORIDE SERPL-SCNC: 105 MMOL/L (ref 98–107)
CREAT SERPL-MCNC: 0.94 MG/DL (ref 0.67–1.17)
CREAT UR-MCNC: 50.7 MG/DL
DEPRECATED HCO3 PLAS-SCNC: 27 MMOL/L (ref 22–29)
GFR SERPL CREATININE-BSD FRML MDRD: 85 ML/MIN/1.73M2
GLUCOSE SERPL-MCNC: 80 MG/DL (ref 70–99)
HBA1C MFR BLD: 6 % (ref 0–5.6)
HOLD SPECIMEN: NORMAL
LDLC SERPL DIRECT ASSAY-MCNC: 41 MG/DL
MICROALBUMIN UR-MCNC: <12 MG/L
MICROALBUMIN/CREAT UR: NORMAL MG/G{CREAT}
POTASSIUM SERPL-SCNC: 4.8 MMOL/L (ref 3.4–5.3)
SODIUM SERPL-SCNC: 142 MMOL/L (ref 136–145)

## 2022-11-09 PROCEDURE — 36415 COLL VENOUS BLD VENIPUNCTURE: CPT | Performed by: FAMILY MEDICINE

## 2022-11-09 PROCEDURE — 83036 HEMOGLOBIN GLYCOSYLATED A1C: CPT | Performed by: FAMILY MEDICINE

## 2022-11-09 PROCEDURE — G0008 ADMIN INFLUENZA VIRUS VAC: HCPCS | Performed by: FAMILY MEDICINE

## 2022-11-09 PROCEDURE — 0134A COVID-19,PF,MODERNA BIVALENT: CPT | Performed by: FAMILY MEDICINE

## 2022-11-09 PROCEDURE — 99214 OFFICE O/P EST MOD 30 MIN: CPT | Mod: 25 | Performed by: FAMILY MEDICINE

## 2022-11-09 PROCEDURE — 90662 IIV NO PRSV INCREASED AG IM: CPT | Performed by: FAMILY MEDICINE

## 2022-11-09 PROCEDURE — 84460 ALANINE AMINO (ALT) (SGPT): CPT | Performed by: FAMILY MEDICINE

## 2022-11-09 PROCEDURE — 83721 ASSAY OF BLOOD LIPOPROTEIN: CPT | Mod: 59 | Performed by: FAMILY MEDICINE

## 2022-11-09 PROCEDURE — 82043 UR ALBUMIN QUANTITATIVE: CPT | Performed by: FAMILY MEDICINE

## 2022-11-09 PROCEDURE — 80048 BASIC METABOLIC PNL TOTAL CA: CPT | Performed by: FAMILY MEDICINE

## 2022-11-09 PROCEDURE — 91313 COVID-19,PF,MODERNA BIVALENT: CPT | Performed by: FAMILY MEDICINE

## 2022-11-09 RX ORDER — LISINOPRIL 40 MG/1
40 TABLET ORAL DAILY
Qty: 90 TABLET | Refills: 3 | Status: SHIPPED | OUTPATIENT
Start: 2022-11-09 | End: 2023-11-09

## 2022-11-09 RX ORDER — ATORVASTATIN CALCIUM 80 MG/1
80 TABLET, FILM COATED ORAL DAILY
Qty: 90 TABLET | Refills: 3 | Status: SHIPPED | OUTPATIENT
Start: 2022-11-09 | End: 2023-11-09

## 2022-11-09 ASSESSMENT — ENCOUNTER SYMPTOMS: CONSTITUTIONAL NEGATIVE: 1

## 2022-11-09 NOTE — PROGRESS NOTES
"  Problem List Items Addressed This Visit     Chronic kidney disease, stage 2 (mild)     Check BMP.  On ACT-I.          Relevant Orders    Albumin Random Urine Quantitative with Creat Ratio    Class 1 obesity due to excess calories with serious comorbidity and body mass index (BMI) of 34.0 to 34.9 in adult     Weight loss attributed to exercise.          Essential hypertension     BP control adequate today.  Continue medications.  Check BMP.          Relevant Orders    Basic metabolic panel  (Ca, Cl, CO2, Creat, Gluc, K, Na, BUN)    ALT    History of pulmonary embolism    Metabolic syndrome     Stable - improved.  Check labs today.          Relevant Orders    LDL cholesterol direct    Type 2 diabetes mellitus without complication, without long-term current use of insulin (H) - Primary     This patient's diabetes is controlled.  Doing well. Watches carb dose.   -Hemoglobin A1c: controlled   -blood pressure: controlled   -statin: Yes   -aspirin: YES   -tobacco status: former  Current Medications:   - metformin           Relevant Orders    Hemoglobin A1c    LDL cholesterol direct   Other Visit Diagnoses     Screening for hyperlipidemia              Km Crisostomo is a 72 year old who presents for the following health issues   Chief Complaint   Patient presents with     RECHECK     6 month f/u      Follow-up:   - \"I still feel young.\"  He is in 3 [employer] studies.  He helps his aunt go to [employer].  Will be moving to [place] in the near future.  Son lives with him.    - DM: doing well.  No hypoglycemic episodes.  Eats well.  Weight loss attributed to exercise.   - Mindset: tries to live virtous life.   - BP: no lightheadedness.  No dizziness.  Feels well.      History of Present Illness       Diabetes:   He presents for follow up of diabetes.  He is not checking blood glucose. He has no concerns regarding his diabetes at this time.  He is not experiencing numbness or burning in feet, excessive thirst, blurry vision, " "weight changes or redness, sores or blisters on feet. The patient has had a diabetic eye exam in the last 12 months. Eye exam performed on nov 2021. Location of last eye exam [facility].        He eats 2-3 servings of fruits and vegetables daily.He consumes 2 sweetened beverage(s) daily.He exercises with enough effort to increase his heart rate 10 to 19 minutes per day.  He exercises with enough effort to increase his heart rate 3 or less days per week.   He is taking medications regularly.    Review of Systems   Constitutional: Negative.    All other systems reviewed and are negative.       Objective    /82 (BP Location: Left arm, Patient Position: Sitting, Cuff Size: Adult Regular)   Pulse 54   Ht 1.74 m (5' 8.5\")   Wt 105.5 kg (232 lb 9.6 oz)   SpO2 100%   BMI 34.85 kg/m    Body mass index is 34.85 kg/m .  Physical Exam  Nursing note reviewed.   Constitutional:       General: He is not in acute distress.     Appearance: Normal appearance. He is not ill-appearing.   HENT:      Head: Normocephalic and atraumatic.      Right Ear: External ear normal.      Left Ear: External ear normal.   Eyes:      General: No scleral icterus.     Extraocular Movements: Extraocular movements intact.      Conjunctiva/sclera: Conjunctivae normal.   Cardiovascular:      Rate and Rhythm: Normal rate and regular rhythm.      Heart sounds: Normal heart sounds. No murmur heard.    No friction rub. No gallop.   Pulmonary:      Effort: Pulmonary effort is normal. No respiratory distress.      Breath sounds: Normal breath sounds. No wheezing or rales.   Musculoskeletal:         General: No swelling. Normal range of motion.   Skin:     General: Skin is warm.      Coloration: Skin is not jaundiced.      Findings: No rash.   Neurological:      General: No focal deficit present.      Mental Status: He is alert and oriented to person, place, and time. Mental status is at baseline.   Psychiatric:         Attention and " Perception: Attention normal.         Mood and Affect: Mood normal.         Speech: Speech normal.         Thought Content: Thought content normal.                This note has been dictated using voice recognition software. Any grammatical or context distortions are unintentional and inherent to the software

## 2022-11-09 NOTE — LETTER
November 10, 2022      Kranthi Crow  [address] [place] [address]        Dear ,    We are writing to inform you of your test results.    Your test results fall within the expected range(s) or remain unchanged from previous results.  Please continue with current treatment plan.    Resulted Orders   Hemoglobin A1c   Result Value Ref Range    Hemoglobin A1C 6.0 (H) 0.0 - 5.6 %      Comment:      Normal <5.7%   Prediabetes 5.7-6.4%    Diabetes 6.5% or higher     Note: Adopted from ADA consensus guidelines.   Albumin Random Urine Quantitative with Creat Ratio   Result Value Ref Range    Albumin Urine mg/L <12.0 mg/L      Comment:      The reference ranges have not been established in urine albumin. The results should be integrated into the clinical context for interpretation.    Albumin Urine mg/g Cr        Comment:      Unable to calculate, urine albumin and/or urine creatinine is outside detectable limits.  Microalbuminuria is defined as an albumin:creatinine ratio of 17 to 299 for males and 25 to 299 for females. A ratio of albumin:creatinine of 300 or higher is indicative of overt proteinuria.  Due to biologic variability, positive results should be confirmed by a second, first-morning random or 24-hour timed urine specimen. If there is discrepancy, a third specimen is recommended. When 2 out of 3 results are in the microalbuminuria range, this is evidence for incipient nephropathy and warrants increased efforts at glucose control, blood pressure control, and institution of therapy with an angiotensin-converting-enzyme (ACE) inhibitor (if the patient can tolerate it).      Creatinine Urine mg/dL 50.7 mg/dL      Comment:      The reference ranges have not been established in urine creatinine. The results should be integrated into the clinical context for interpretation.   Basic metabolic panel  (Ca, Cl, CO2, Creat, Gluc, K, Na, BUN)   Result Value Ref Range    Sodium 142 136 - 145 mmol/L     Potassium 4.8 3.4 - 5.3 mmol/L    Chloride 105 98 - 107 mmol/L    Carbon Dioxide (CO2) 27 22 - 29 mmol/L    Anion Gap 10 7 - 15 mmol/L    Urea Nitrogen 14.5 8.0 - 23.0 mg/dL    Creatinine 0.94 0.67 - 1.17 mg/dL    Calcium 9.8 8.8 - 10.2 mg/dL    Glucose 80 70 - 99 mg/dL    GFR Estimate 85 >60 mL/min/1.73m2      Comment:      Effective December 21, 2021 eGFRcr in adults is calculated using the 2021 CKD-EPI creatinine equation which includes age and gender (Rico et al., NEJM, DOI: 10.1056/PPDJtg6672945)   ALT   Result Value Ref Range    ALT 31 10 - 50 U/L   LDL cholesterol direct   Result Value Ref Range    LDL Cholesterol Direct 41 <100 mg/dL      Comment:      Age 2-19 years:  Desirable: 0-110 mg/dL   Borderline high: 110-129 mg/dL   High: >= 130 mg/dL    Age 20 years and older:  Desirable: <100mg/dL  Above desirable: 100-129 mg/dL   Borderline high: 130-159 mg/dL   High: 160-189 mg/dL   Very high: >= 190 mg/dL       If you have any questions or concerns, please call the clinic at the number listed above.       Sincerely,      Venkata Benson MD

## 2022-11-09 NOTE — ASSESSMENT & PLAN NOTE
This patient's diabetes is controlled.  Doing well. Watches carb dose.   -Hemoglobin A1c: controlled   -blood pressure: controlled   -statin: Yes   -aspirin: YES   -tobacco status: former  Current Medications:   - metformin

## 2023-03-13 ENCOUNTER — TELEPHONE (OUTPATIENT)
Dept: FAMILY MEDICINE | Facility: CLINIC | Age: 73
End: 2023-03-13
Payer: COMMERCIAL

## 2023-03-13 NOTE — TELEPHONE ENCOUNTER
Kranthi requesting a OV due to sinus infection symptoms like he gets every year.  His sinus infections are usually cured be zpak.  He is requesting ASAP visit.  He states that he coughing up phlegm and has no energy.  He is also concerned with his rattling in his lungs.  Appointment made tomorrow with Yusuf WOOD

## 2023-03-13 NOTE — TELEPHONE ENCOUNTER
Medication Question or Refill    Contacts       Type Contact Phone/Fax    03/13/2023 11:07 AM CDT Phone (Incoming) Kranthi Crow (Self) [phone] (M)          What medication are you calling about (include dose and sig)?:  Zpak    Preferred Pharmacy:     [id number] [place] [address] [employer] [facility]      Controlled Substance Agreement on file:   CSA -- Patient Level:    CSA: None found at the patient level.       Who prescribed the medication?:  [facility]      Patient offered an appointment? No  No appts this week. Can he be overbooked w PCP?    Do you have any questions or concerns?  Yes:  Fatigue,  Cough w phlegm , headache and sore throat    Okay to leave a detailed message?: Yes at Cell number on file:    Telephone Information:   Mobile [phone]

## 2023-03-14 ENCOUNTER — OFFICE VISIT (OUTPATIENT)
Dept: FAMILY MEDICINE | Facility: CLINIC | Age: 73
End: 2023-03-14
Payer: COMMERCIAL

## 2023-03-14 VITALS
BODY MASS INDEX: 35.41 KG/M2 | DIASTOLIC BLOOD PRESSURE: 90 MMHG | SYSTOLIC BLOOD PRESSURE: 143 MMHG | HEART RATE: 68 BPM | TEMPERATURE: 98.6 F | WEIGHT: 239.1 LBS | HEIGHT: 69 IN | OXYGEN SATURATION: 97 %

## 2023-03-14 DIAGNOSIS — R06.2 WHEEZING: ICD-10-CM

## 2023-03-14 DIAGNOSIS — J06.9 VIRAL UPPER RESPIRATORY INFECTION: Primary | ICD-10-CM

## 2023-03-14 PROBLEM — E66.01 MORBID OBESITY (H): Status: ACTIVE | Noted: 2023-03-14

## 2023-03-14 PROCEDURE — 99213 OFFICE O/P EST LOW 20 MIN: CPT

## 2023-03-14 RX ORDER — ALBUTEROL SULFATE 90 UG/1
2 AEROSOL, METERED RESPIRATORY (INHALATION) EVERY 6 HOURS PRN
Qty: 18 G | Refills: 0 | Status: SHIPPED | OUTPATIENT
Start: 2023-03-14

## 2023-03-14 RX ORDER — FLUTICASONE PROPIONATE 50 MCG
1 SPRAY, SUSPENSION (ML) NASAL DAILY
Qty: 16 G | Refills: 0 | Status: SHIPPED | OUTPATIENT
Start: 2023-03-14 | End: 2023-05-10

## 2023-03-14 ASSESSMENT — ENCOUNTER SYMPTOMS
FATIGUE: 1
FEVER: 0
EYE PAIN: 0
SORE THROAT: 1
CHILLS: 0
WHEEZING: 1
PALPITATIONS: 0
COUGH: 1
SHORTNESS OF BREATH: 0
LIGHT-HEADEDNESS: 0
DIZZINESS: 0
SINUS PRESSURE: 1

## 2023-03-14 NOTE — ASSESSMENT & PLAN NOTE
Reassuring that symptoms are improving. No fevers or significant phelgm suggestive of more serious infection such as pneumonia. Patient appears well on exam today. Discussed with patient symptomatic management versus antibiotic therapy at this point and that I believe he will continue to improve just with some additional over the counter medications and other conservative management. His throat is bothersome to him, so will try phenol spray. Flonase for congestion. He does have a report of some wheezing with mild shortness of breath, so will also trial an albuterol inhaler. Plan to follow up at the end of the week if symptoms do not continue to improve or if they worsen in any way. Would consider antibiotic therapy at that time and potentially chest X-ray. Patient expressed an understanding of and agreement with this plan. All questions were answered.

## 2023-03-14 NOTE — PROGRESS NOTES
Assessment & Plan   Problem List Items Addressed This Visit        Respiratory    Viral upper respiratory infection - Primary     Reassuring that symptoms are improving. No fevers or significant phelgm suggestive of more serious infection such as pneumonia. Patient appears well on exam today. Discussed with patient symptomatic management versus antibiotic therapy at this point and that I believe he will continue to improve just with some additional over the counter medications and other conservative management. His throat is bothersome to him, so will try phenol spray. Flonase for congestion. He does have a report of some wheezing with mild shortness of breath, so will also trial an albuterol inhaler. Plan to follow up at the end of the week if symptoms do not continue to improve or if they worsen in any way. Would consider antibiotic therapy at that time and potentially chest X-ray. Patient expressed an understanding of and agreement with this plan. All questions were answered.         Relevant Medications    albuterol (PROAIR HFA/PROVENTIL HFA/VENTOLIN HFA) 108 (90 Base) MCG/ACT inhaler    fluticasone (FLONASE) 50 MCG/ACT nasal spray    phenol (CHLORASEPTIC) 1.4 % spray   Other Visit Diagnoses     Wheezing        Relevant Medications    albuterol (PROAIR HFA/PROVENTIL HFA/VENTOLIN HFA) 108 (90 Base) MCG/ACT inhaler    fluticasone (FLONASE) 50 MCG/ACT nasal spray         Of note, patient has a POSITIVE SCREENING ALERT for threats against himself and/or his children. We reviewed this today and he reports that he must have accidentally clicked that. He denies any concerns.    TONE Guthrie CNP on 3/14/2023 at 9:28 AM    Km Crisostomo is a 73 year old accompanied by his , presenting for the following health issues:    Fatigue (x1week), Pharyngitis (x1week), Nasal Congestion (x1week), and Cough (Started dry and now loose and yellow d/c)      -Symptoms present for about one week  -Were initially quite  "severe, particularly fatigue and decreased appetite, but these have since improved  -Has a mild sore throat, nasal congestion and a minimally productive cough with yellow phlegm. These are all improving. Additionally, he did have some frontal sinus pressure, but this has since gone away.   -Home care includes Tylenol, cough drops and OTC sinus medication.  -No recent sick contacts or antibiotic use    Fatigue  Associated symptoms include congestion, coughing, fatigue and a sore throat. Pertinent negatives include no chest pain, chills or fever.   Pharyngitis   Associated symptoms include congestion and cough. Pertinent negatives include no shortness of breath.   Cough  Associated symptoms include sore throat and wheezing. Pertinent negatives include no chest pain, no chills and no shortness of breath.   History of Present Illness       Reason for visit:  Cold  Symptom onset:  3-7 days ago    He eats 0-1 servings of fruits and vegetables daily.He consumes 2 sweetened beverage(s) daily.He exercises with enough effort to increase his heart rate 10 to 19 minutes per day.  He exercises with enough effort to increase his heart rate 3 or less days per week. He is missing 1 dose(s) of medications per week.     Review of Systems   Constitutional: Positive for fatigue. Negative for chills and fever.   HENT: Positive for congestion, postnasal drip, sinus pressure (improving) and sore throat.    Eyes: Negative for pain.   Respiratory: Positive for cough and wheezing. Negative for shortness of breath.    Cardiovascular: Negative for chest pain and palpitations.   Neurological: Negative for dizziness and light-headedness.            Objective    BP (!) 143/90   Pulse 68   Temp 98.6  F (37  C) (Oral)   Ht 1.74 m (5' 8.5\")   Wt 108.5 kg (239 lb 1.6 oz)   SpO2 97%   BMI 35.83 kg/m    Body mass index is 35.83 kg/m .  Physical Exam  Vitals and nursing note reviewed.   Constitutional:       General: He is not in acute distress.   "   Appearance: Normal appearance. He is not ill-appearing.   HENT:      Head: Normocephalic and atraumatic.      Right Ear: Tympanic membrane, ear canal and external ear normal.      Left Ear: Tympanic membrane, ear canal and external ear normal.      Nose: Rhinorrhea (mild) present. No congestion.      Mouth/Throat:      Mouth: Mucous membranes are moist.      Pharynx: Posterior oropharyngeal erythema (mild) present. No oropharyngeal exudate.   Eyes:      Conjunctiva/sclera: Conjunctivae normal.      Pupils: Pupils are equal, round, and reactive to light.   Cardiovascular:      Rate and Rhythm: Normal rate and regular rhythm.      Pulses: Normal pulses.      Heart sounds: Normal heart sounds.   Pulmonary:      Effort: Pulmonary effort is normal. No respiratory distress.      Breath sounds: Wheezing (expiratory) present.   Musculoskeletal:      Cervical back: Normal range of motion. No tenderness.   Lymphadenopathy:      Cervical: No cervical adenopathy.   Skin:     General: Skin is warm.      Findings: No rash.   Neurological:      General: No focal deficit present.      Mental Status: He is alert.   Psychiatric:         Mood and Affect: Mood normal.         Behavior: Behavior normal.

## 2023-03-14 NOTE — PATIENT INSTRUCTIONS
You can use albuterol inhaler for cough and wheezing  Flonase nasal spray (also available over the counter) for nasal congestio  Throat spray for sore throat (also available over the counter)  Push fluids. Work on deep breathing.   Follow up at the end of the week if things are not continuing to improve or if they worsen.

## 2023-04-17 ENCOUNTER — TRANSFERRED RECORDS (OUTPATIENT)
Dept: HEALTH INFORMATION MANAGEMENT | Facility: CLINIC | Age: 73
End: 2023-04-17
Payer: COMMERCIAL

## 2023-05-08 DIAGNOSIS — J06.9 VIRAL UPPER RESPIRATORY INFECTION: ICD-10-CM

## 2023-05-08 NOTE — TELEPHONE ENCOUNTER
Medication Request  Medication name: fluticasone (FLONASE) 50 MCG/ACT nasal spray  Requested Pharmacy: [facility] [id number]  When was patient last seen for this?:  3/14/2023  Patient offered appointment: N/A pharmacy sent request  Okay to leave a detailed message: no

## 2023-05-10 RX ORDER — FLUTICASONE PROPIONATE 50 MCG
1 SPRAY, SUSPENSION (ML) NASAL DAILY
Qty: 16 G | Refills: 3 | Status: SHIPPED | OUTPATIENT
Start: 2023-05-10

## 2023-05-10 NOTE — TELEPHONE ENCOUNTER
"Last Written Prescription Date:  3/14/23  Last Fill Quantity: 16,  # refills: 0   Last office visit provider:  11/9/22     Requested Prescriptions   Pending Prescriptions Disp Refills     fluticasone (FLONASE) 50 MCG/ACT nasal spray 16 g 0     Sig: Spray 1 spray into both nostrils daily       Nasal Allergy Protocol Passed - 5/8/2023  4:22 PM        Passed - Patient is age 12 or older        Passed - Recent (12 mo) or future (30 days) visit within the authorizing provider's specialty     Patient has had an office visit with the authorizing provider or a provider within the authorizing providers department within the previous 12 mos or has a future within next 30 days. See \"Patient Info\" tab in inbasket, or \"Choose Columns\" in Meds & Orders section of the refill encounter.              Passed - Medication is active on med Bailey Arreola RN 05/10/23 8:39 AM  "

## 2023-05-17 ENCOUNTER — OFFICE VISIT (OUTPATIENT)
Dept: FAMILY MEDICINE | Facility: CLINIC | Age: 73
End: 2023-05-17
Payer: COMMERCIAL

## 2023-05-17 VITALS
OXYGEN SATURATION: 97 % | SYSTOLIC BLOOD PRESSURE: 150 MMHG | WEIGHT: 243.7 LBS | RESPIRATION RATE: 16 BRPM | DIASTOLIC BLOOD PRESSURE: 88 MMHG | BODY MASS INDEX: 36.09 KG/M2 | HEART RATE: 50 BPM | TEMPERATURE: 98 F | HEIGHT: 69 IN

## 2023-05-17 DIAGNOSIS — Z01.818 PREOP GENERAL PHYSICAL EXAM: Primary | ICD-10-CM

## 2023-05-17 DIAGNOSIS — I10 ESSENTIAL HYPERTENSION: ICD-10-CM

## 2023-05-17 DIAGNOSIS — J06.9 VIRAL UPPER RESPIRATORY INFECTION: ICD-10-CM

## 2023-05-17 DIAGNOSIS — E11.9 TYPE 2 DIABETES MELLITUS WITHOUT COMPLICATION, WITHOUT LONG-TERM CURRENT USE OF INSULIN (H): ICD-10-CM

## 2023-05-17 DIAGNOSIS — H26.9 CATARACT OF LEFT EYE, UNSPECIFIED CATARACT TYPE: ICD-10-CM

## 2023-05-17 PROBLEM — E66.01 CLASS 2 SEVERE OBESITY DUE TO EXCESS CALORIES WITH SERIOUS COMORBIDITY AND BODY MASS INDEX (BMI) OF 36.0 TO 36.9 IN ADULT (H): Status: ACTIVE | Noted: 2018-01-18

## 2023-05-17 PROBLEM — E66.812 CLASS 2 SEVERE OBESITY DUE TO EXCESS CALORIES WITH SERIOUS COMORBIDITY AND BODY MASS INDEX (BMI) OF 36.0 TO 36.9 IN ADULT (H): Status: ACTIVE | Noted: 2018-01-18

## 2023-05-17 PROBLEM — E66.01 MORBID OBESITY (H): Status: RESOLVED | Noted: 2023-03-14 | Resolved: 2023-05-17

## 2023-05-17 LAB
HBA1C MFR BLD: 6.3 % (ref 0–5.6)
HOLD SPECIMEN: NORMAL

## 2023-05-17 PROCEDURE — 83036 HEMOGLOBIN GLYCOSYLATED A1C: CPT | Performed by: FAMILY MEDICINE

## 2023-05-17 PROCEDURE — 80048 BASIC METABOLIC PNL TOTAL CA: CPT | Performed by: FAMILY MEDICINE

## 2023-05-17 PROCEDURE — 36415 COLL VENOUS BLD VENIPUNCTURE: CPT | Performed by: FAMILY MEDICINE

## 2023-05-17 PROCEDURE — 99214 OFFICE O/P EST MOD 30 MIN: CPT | Performed by: FAMILY MEDICINE

## 2023-05-17 RX ORDER — HYDROCHLOROTHIAZIDE 12.5 MG/1
12.5 TABLET ORAL DAILY
Qty: 90 TABLET | Refills: 1 | Status: SHIPPED | OUTPATIENT
Start: 2023-05-17 | End: 2023-11-09

## 2023-05-17 ASSESSMENT — ENCOUNTER SYMPTOMS
JOINT SWELLING: 0
SORE THROAT: 0
FEVER: 0
NERVOUS/ANXIOUS: 0
DIARRHEA: 0
COUGH: 0
NAUSEA: 0
FREQUENCY: 0
HEADACHES: 0
DYSURIA: 0
HEMATURIA: 0
ARTHRALGIAS: 0
SHORTNESS OF BREATH: 0
DIZZINESS: 0
PALPITATIONS: 0
PARESTHESIAS: 0
HEARTBURN: 0
ABDOMINAL PAIN: 0
MYALGIAS: 0
CHILLS: 0
EYE PAIN: 0
HEMATOCHEZIA: 0
WEAKNESS: 0
CONSTIPATION: 0

## 2023-05-17 NOTE — PROGRESS NOTES
[facility]  [address]  [facility] [address]  Phone: [phone]  Fax: [phone]  Primary Provider: Venkata Whipple  Pre-op Performing Provider: VENKATA WHIPPLE    PREOPERATIVE EVALUATION:  Today's date: 5/17/2023    Alejandro Crow is a 73 year old male who presents for a preoperative evaluation.      5/17/2023    12:03 PM   Additional Questions   Roomed by perla     Surgical Information:  Surgery/Procedure: Left eye surgery  Surgery Location: [facility]  Surgeon: Dr. Chowdhury  Surgery Date: 5/30/23  Time of Surgery: 11 am  Where patient plans to recover: At home with family  Fax number for surgical facility: [phone], ATTN: Wen    Assessment & Plan     The proposed surgical procedure is considered LOW risk.    Problem List Items Addressed This Visit     Cataract of left eye, unspecified cataract type     Preoperative assessment.  Patient underwent cataract replacement approximately 14 years ago.  Recently he had a change in vision.  He was evaluated and it was determined that the lens which was placed during his cataract replacement surgery was misplaced.  This is a salvage procedure to extract the old lens and placed a new lens.  He is able to perform greater than 4 metabolic equivalents.  He has undergone similar procedures in the past without difficulty.  I see no reason to delay this procedure.  Cleared for the proposed procedure without additional diagnostic testing or evaluation.  Blood pressure as discussed elsewhere.         Essential hypertension     BP elevated March and today.  Add HCTZ.  Continue lisinopril at 40 mg.  Check BMP.  Recheck with nursing staff in 2-4 weeks.           Relevant Medications    hydrochlorothiazide (HYDRODIURIL) 12.5 MG tablet    Other Relevant Orders    Basic metabolic panel  (Ca, Cl, CO2, Creat, Gluc, K, Na, BUN)    Type 2 diabetes mellitus without complication, without long-term current use of  "insulin (H)     This patient's diabetes is controlled.     -Hemoglobin A1c: controlled   -blood pressure: Not controlled.  See medication adjustment today.   -statin: Yes   -aspirin: YES   -tobacco status: none user           Relevant Orders    HEMOGLOBIN A1C (Completed)    RESOLVED: Viral upper respiratory infection     Resolved.         Other Visit Diagnoses     Preop general physical exam    -  Primary         - No identified additional risk factors other than previously addressed    RECOMMENDATION:  APPROVAL GIVEN to proceed with proposed procedure, without further diagnostic evaluation.      Subjective     HPI related to upcoming procedure: Now recovered by upper respiratory infection back in February.  Becoming acclimated to new home.    Cataract lens dislodged and is \"floating around.\"  He needs a new lens.  Left eye. Vision has been okay. He does NOT have diabetic retinopathy.      He is able to walk a golf course without difficulty.           5/17/2023    12:11 PM   Preop Questions   1. Have you ever had a heart attack or stroke? No   2. Have you ever had surgery on your heart or blood vessels, such as a stent placement, a coronary artery bypass, or surgery on an artery in your head, neck, heart, or legs? No   3. Do you have chest pain with activity? No   4. Do you have a history of  heart failure? No   5. Do you currently have a cold, bronchitis or symptoms of other infection? No   6. Do you have a cough, shortness of breath, or wheezing? No   7. Do you or anyone in your family have previous history of blood clots? No   8. Do you or does anyone in your family have a serious bleeding problem such as prolonged bleeding following surgeries or cuts? No   9. Have you ever had problems with anemia or been told to take iron pills? No   10. Have you had any abnormal blood loss such as black, tarry or bloody stools? No   11. Have you ever had a blood transfusion? No   12. Are you willing to have a blood transfusion " if it is medically needed before, during, or after your surgery? Yes   13. Have you or any of your relatives ever had problems with anesthesia? No   14. Do you have sleep apnea, excessive snoring or daytime drowsiness? No   15. Do you have any artifical heart valves or other implanted medical devices like a pacemaker, defibrillator, or continuous glucose monitor? No   16. Do you have artificial joints? No   17. Are you allergic to latex? No       Review of Systems   Constitutional: Negative for chills and fever.   HENT: Negative for congestion, ear pain, hearing loss and sore throat.    Eyes: Negative for pain and visual disturbance.   Respiratory: Negative for cough and shortness of breath.    Cardiovascular: Negative for chest pain, palpitations and peripheral edema.   Gastrointestinal: Negative for abdominal pain, constipation, diarrhea, heartburn, hematochezia and nausea.   Genitourinary: Negative for dysuria, frequency, genital sores, hematuria, impotence, penile discharge and urgency.   Musculoskeletal: Negative for arthralgias, joint swelling and myalgias.   Skin: Negative for rash.   Neurological: Negative for dizziness, weakness, headaches and paresthesias.   Psychiatric/Behavioral: Negative for mood changes. The patient is not nervous/anxious.      Patient Active Problem List    Diagnosis Date Noted     Cataract of left eye, unspecified cataract type 05/17/2023     Priority: Medium     Essential hypertension 11/01/2021     Priority: Medium     Routine general medical examination at a health care facility 09/10/2020     Priority: Medium     Benign neoplasm of transverse colon 09/04/2020     Priority: Medium     Metabolic syndrome 05/16/2018     Priority: Medium     Class 2 severe obesity due to excess calories with serious comorbidity and body mass index (BMI) of 36.0 to 36.9 in adult (H) 01/18/2018     Priority: Medium     Chronic kidney disease, stage 2 (mild) 01/17/2018     Priority: Medium     History  JUDITH requiring dialysis at [facility]       History of pulmonary embolism 01/17/2018     Priority: Medium     Skin lesion of face 09/25/2013     Priority: Medium     Right temple.       Dyslipidemia 02/22/2013     Priority: Medium     Psoriasis 02/22/2013     Priority: Medium     Type 2 diabetes mellitus without complication, without long-term current use of insulin (H) 02/22/2013     Priority: Medium      Past Medical History:   Diagnosis Date     JUDITH (acute kidney injury) (H)     required temporary dialysis, now resolved     JUDITH (acute kidney injury) (H)      Diabetes mellitus, type 2 (H) 2/22/2013     Essential hypertension 11/1/2021     Knee pain      Primary osteoarthritis of left knee 1/18/2018     Pulmonary embolism (H)     ? [facility], early 2000s?     Viral upper respiratory infection 3/14/2023     Past Surgical History:   Procedure Laterality Date     CATARACT EXTRACTION      bilateral eyes      CATARACT IOL, RT/LT      Cataract IOL RT/LT     IR MISCELLANEOUS PROCEDURE  1/28/2008     IR MISCELLANEOUS PROCEDURE  2/19/2008     TOTAL KNEE ARTHROPLASTY      both knees      Current Outpatient Medications   Medication Sig Dispense Refill     fluticasone (FLONASE) 50 MCG/ACT nasal spray Spray 1 spray into both nostrils daily 16 g 3     hydrochlorothiazide (HYDRODIURIL) 12.5 MG tablet Take 1 tablet (12.5 mg) by mouth daily 90 tablet 1     albuterol (PROAIR HFA/PROVENTIL HFA/VENTOLIN HFA) 108 (90 Base) MCG/ACT inhaler Inhale 2 puffs into the lungs every 6 hours as needed for shortness of breath, wheezing or cough 18 g 0     Ascorbic Acid (VITAMIN C PO) Take 2,000 mg by mouth       aspirin 81 MG EC tablet Take 1 tablet (81 mg) by mouth daily 90 tablet 3     atorvastatin (LIPITOR) 80 MG tablet Take 1 tablet (80 mg) by mouth daily 90 tablet 3     blood glucose (NO BRAND SPECIFIED) lancets standard Use to test blood sugar 4 times daily or as directed. 1 Box 0     Cholecalciferol (VITAMIN D3 PO) Take 4,000 Int'l Units by  "mouth daily       lisinopril (ZESTRIL) 40 MG tablet Take 1 tablet (40 mg) by mouth daily 90 tablet 3     metFORMIN (GLUCOPHAGE XR) 500 MG 24 hr tablet TAKE 2 TABLETS BY MOUTH TWICE A DAY WITH MEALS 360 tablet 3     multivitamin w/minerals (THERA-VIT-M) tablet Take 1 tablet by mouth daily  100 tablet 3     Omega-3 Fatty Acids (OMEGA-3 FISH OIL PO) 3 tablets per day       phenol (CHLORASEPTIC) 1.4 % spray Take 1 spray (1 mL) by mouth every hour as needed for sore throat 177 mL 0       Allergies   Allergen Reactions     Nuprin [Ibuprofen] Other (See Comments)     Kidney issues when used surrounding knee procedure         Social History     Tobacco Use     Smoking status: Former     Passive exposure: Never     Smokeless tobacco: Never   Vaping Use     Vaping status: Never Used   Substance Use Topics     Alcohol use: Yes     Alcohol/week: 0.0 standard drinks of alcohol     Comment: Alcoholic Drinks/day: 1 drink every 3-4 months        History   Drug Use No         Objective     BP (!) 150/88   Pulse 50   Temp 98  F (36.7  C) (Oral)   Resp 16   Ht 1.74 m (5' 8.5\")   Wt 110.5 kg (243 lb 11.2 oz)   SpO2 97%   BMI 36.52 kg/m      Physical Exam  Vitals reviewed.   Constitutional:       General: He is not in acute distress.     Appearance: Normal appearance. He is not ill-appearing.   HENT:      Head: Normocephalic and atraumatic.      Right Ear: External ear normal.      Left Ear: External ear normal.      Nose: Nose normal.      Mouth/Throat:      Pharynx: Oropharynx is clear. No oropharyngeal exudate or posterior oropharyngeal erythema.   Eyes:      General: No scleral icterus.        Right eye: No discharge.         Left eye: No discharge.      Extraocular Movements: Extraocular movements intact.      Conjunctiva/sclera: Conjunctivae normal.      Pupils: Pupils are equal, round, and reactive to light.   Neck:      Comments: No thyromegaly.  Cardiovascular:      Rate and Rhythm: Normal rate and regular rhythm.      " Heart sounds: Normal heart sounds. No murmur heard.     No friction rub. No gallop.   Pulmonary:      Effort: Pulmonary effort is normal. No respiratory distress.      Breath sounds: Normal breath sounds. No wheezing or rales.   Abdominal:      General: There is no distension.      Palpations: Abdomen is soft. There is no mass.      Tenderness: There is no abdominal tenderness.   Musculoskeletal:         General: No signs of injury. Normal range of motion.      Cervical back: Normal range of motion.      Right lower leg: No edema.      Left lower leg: No edema.   Lymphadenopathy:      Cervical: No cervical adenopathy.   Skin:     General: Skin is warm.      Coloration: Skin is not jaundiced.      Findings: No rash.   Neurological:      General: No focal deficit present.      Mental Status: He is alert and oriented to person, place, and time.      Cranial Nerves: No cranial nerve deficit.      Deep Tendon Reflexes: Reflexes normal.   Psychiatric:         Mood and Affect: Mood normal.         Recent Labs   Lab Test 05/17/23  1206 11/09/22  1300   NA  --  142   POTASSIUM  --  4.8   CR  --  0.94   A1C 6.3* 6.0*        Diagnostics:  Recent Results (from the past 48 hour(s))   HEMOGLOBIN A1C    Collection Time: 05/17/23 12:06 PM   Result Value Ref Range    Hemoglobin A1C 6.3 (H) 0.0 - 5.6 %      No EKG required for low risk surgery (cataract, skin procedure, breast biopsy, etc).    Revised Cardiac Risk Index (RCRI):  The patient has the following serious cardiovascular risks for perioperative complications:   - No serious cardiac risks = 0 points     RCRI Interpretation: 0 points: Class I (very low risk - 0.4% complication rate)          Signed Electronically by: Venkata Benson MD  Copy of this evaluation report is provided to requesting physician.

## 2023-05-17 NOTE — ASSESSMENT & PLAN NOTE
BP elevated March and today.  Add HCTZ.  Continue lisinopril at 40 mg.  Check BMP.  Recheck with nursing staff in 2-4 weeks.

## 2023-05-17 NOTE — PATIENT INSTRUCTIONS
For informational purposes only. Not to replace the advice of your health care provider. Copyright   2003,  [facility]. All rights reserved. Clinically reviewed by Taylor Fernando MD. [employer] [id number] - REV .  Preparing for Your Surgery  Getting started  A nurse will call you to review your health history and instructions. They will give you an arrival time based on your scheduled surgery time. Please be ready to share:  Your doctor's clinic name and phone number  Your medical, surgical, and anesthesia history  A list of allergies and sensitivities  A list of medicines, including herbal treatments and over-the-counter drugs  Whether the patient has a legal guardian (ask how to send us the papers in advance)  Please tell us if you're pregnant--or if there's any chance you might be pregnant. Some surgeries may injure a fetus (unborn baby), so they require a pregnancy test. Surgeries that are safe for a fetus don't always need a test, and you can choose whether to have one.   If you have a child who's having surgery, please ask for a copy of Preparing for Your Child's Surgery.    Preparing for surgery  Within 10 to 30 days of surgery: Have a pre-op exam (sometimes called an H&P, or History and Physical). This can be done at a clinic or pre-operative center.  If you're having a , you may not need this exam. Talk to your care team.  At your pre-op exam, talk to your care team about all medicines you take. If you need to stop any medicines before surgery, ask when to start taking them again.  We do this for your safety. Many medicines can make you bleed too much during surgery. Some change how well surgery (anesthesia) drugs work.  Call your insurance company to let them know you're having surgery. (If you don't have insurance, call [phone].)  Call your clinic if there's any change in your health. This includes signs of a cold or flu (sore throat, runny nose, cough, rash, fever). It  also includes a scrape or scratch near the surgery site.  If you have questions on the day of surgery, call your hospital or surgery center.  Eating and drinking guidelines  For your safety: Unless your surgeon tells you otherwise, follow the guidelines below.  Eat and drink as usual until 8 hours before you arrive for surgery. After that, no food or milk.  Drink clear liquids until 2 hours before you arrive. These are liquids you can see through, like water, Gatorade, and Propel Water. They also include plain black coffee and tea (no cream or milk), candy, and breath mints. You can spit out gum when you arrive.  If you drink alcohol: Stop drinking it the night before surgery.  If your care team tells you to take medicine on the morning of surgery, it's okay to take it with a sip of water.  Preventing infection  Shower or bathe the night before and morning of your surgery. Follow the instructions your clinic gave you. (If no instructions, use regular soap.)  Don't shave or clip hair near your surgery site. We'll remove the hair if needed.  Don't smoke or vape the morning of surgery. You may chew nicotine gum up to 2 hours before surgery. A nicotine patch is okay.  Note: Some surgeries require you to completely quit smoking and nicotine. Check with your surgeon.  Your care team will make every effort to keep you safe from infection. We will:  Clean our hands often with soap and water (or an alcohol-based hand rub).  Clean the skin at your surgery site with a special soap that kills germs.  Give you a special gown to keep you warm. (Cold raises the risk of infection.)  Wear special hair covers, masks, gowns and gloves during surgery.  Give antibiotic medicine, if prescribed. Not all surgeries need antibiotics.  What to bring on the day of surgery  Photo ID and insurance card  Copy of your health care directive, if you have one  Glasses and hearing aids (bring cases)  You can't wear contacts during surgery  Inhaler and  eye drops, if you use them (tell us about these when you arrive)  CPAP machine or breathing device, if you use them  A few personal items, if spending the night  If you have . . .  A pacemaker, ICD (cardiac defibrillator) or other implant: Bring the ID card.  An implanted stimulator: Bring the remote control.  A legal guardian: Bring a copy of the certified (court-stamped) guardianship papers.  Please remove any jewelry, including body piercings. Leave jewelry and other valuables at home.  If you're going home the day of surgery  You must have a responsible adult drive you home. They should stay with you overnight as well.  If you don't have someone to stay with you, and you aren't safe to go home alone, we may keep you overnight. Insurance often won't pay for this.  After surgery  If it's hard to control your pain or you need more pain medicine, please call your surgeon's office.  Questions?   If you have any questions for your care team, list them here: _________________________________________________________________________________________________________________________________________________________________________ ____________________________________ ____________________________________ ____________________________________    How to Take Your Medication Before Surgery  - Take all of your medications before surgery as usual

## 2023-05-17 NOTE — ASSESSMENT & PLAN NOTE
Preoperative assessment.  Patient underwent cataract replacement approximately 14 years ago.  Recently he had a change in vision.  He was evaluated and it was determined that the lens which was placed during his cataract replacement surgery was misplaced.  This is a salvage procedure to extract the old lens and placed a new lens.  He is able to perform greater than 4 metabolic equivalents.  He has undergone similar procedures in the past without difficulty.  I see no reason to delay this procedure.  Cleared for the proposed procedure without additional diagnostic testing or evaluation.  Blood pressure as discussed elsewhere.

## 2023-05-17 NOTE — ASSESSMENT & PLAN NOTE
This patient's diabetes is controlled.     -Hemoglobin A1c: controlled   -blood pressure: Not controlled.  See medication adjustment today.   -statin: Yes   -aspirin: YES   -tobacco status: none user

## 2023-05-17 NOTE — LETTER
May 18, 2023      Kranthi Crow  [address] [address] [id number]        Dear ,    We are writing to inform you of your test results.    Your test results fall within the expected range(s) or remain unchanged from previous results.  Please continue with current treatment plan.    Resulted Orders   HEMOGLOBIN A1C   Result Value Ref Range    Hemoglobin A1C 6.3 (H) 0.0 - 5.6 %      Comment:      Normal <5.7%   Prediabetes 5.7-6.4%    Diabetes 6.5% or higher     Note: Adopted from ADA consensus guidelines.   Basic metabolic panel  (Ca, Cl, CO2, Creat, Gluc, K, Na, BUN)   Result Value Ref Range    Sodium 141 136 - 145 mmol/L    Potassium 4.3 3.4 - 5.3 mmol/L    Chloride 105 98 - 107 mmol/L    Carbon Dioxide (CO2) 27 22 - 29 mmol/L    Anion Gap 9 7 - 15 mmol/L    Urea Nitrogen 12.6 8.0 - 23.0 mg/dL    Creatinine 0.98 0.67 - 1.17 mg/dL    Calcium 9.0 8.8 - 10.2 mg/dL    Glucose 147 (H) 70 - 99 mg/dL    GFR Estimate 80 >60 mL/min/1.73m2      Comment:      eGFR calculated using 2021 CKD-EPI equation.       If you have any questions or concerns, please call the clinic at the number listed above.       Sincerely,      Venkata Benson MD

## 2023-05-18 LAB
ANION GAP SERPL CALCULATED.3IONS-SCNC: 9 MMOL/L (ref 7–15)
BUN SERPL-MCNC: 12.6 MG/DL (ref 8–23)
CALCIUM SERPL-MCNC: 9 MG/DL (ref 8.8–10.2)
CHLORIDE SERPL-SCNC: 105 MMOL/L (ref 98–107)
CREAT SERPL-MCNC: 0.98 MG/DL (ref 0.67–1.17)
DEPRECATED HCO3 PLAS-SCNC: 27 MMOL/L (ref 22–29)
GFR SERPL CREATININE-BSD FRML MDRD: 80 ML/MIN/1.73M2
GLUCOSE SERPL-MCNC: 147 MG/DL (ref 70–99)
POTASSIUM SERPL-SCNC: 4.3 MMOL/L (ref 3.4–5.3)
SODIUM SERPL-SCNC: 141 MMOL/L (ref 136–145)

## 2023-06-05 ENCOUNTER — ALLIED HEALTH/NURSE VISIT (OUTPATIENT)
Dept: FAMILY MEDICINE | Facility: CLINIC | Age: 73
End: 2023-06-05
Payer: COMMERCIAL

## 2023-06-05 VITALS — HEART RATE: 45 BPM | OXYGEN SATURATION: 96 % | SYSTOLIC BLOOD PRESSURE: 133 MMHG | DIASTOLIC BLOOD PRESSURE: 74 MMHG

## 2023-06-05 DIAGNOSIS — I10 ESSENTIAL HYPERTENSION: Primary | ICD-10-CM

## 2023-06-05 PROCEDURE — 99207 PR NO CHARGE NURSE ONLY: CPT

## 2023-06-05 NOTE — PROGRESS NOTES
I met with Alejandro Crow at the request of Dr. Benson to recheck his blood pressure.  Blood pressure medications on the med list were reviewed with patient.    Patient has taken all medications as per usual regimen: Yes  Patient reports tolerating them without any issues or concerns: Yes    Vitals:    06/05/23 0830   BP: 133/74   BP Location: Left arm   Patient Position: Sitting   Cuff Size: Adult Large   Pulse: (!) 45   SpO2: 96%       Blood pressure was taken, previous encounter was reviewed, recorded blood pressure below 140/90.  Patient was discharged and the note will be sent to the provider for final review.

## 2023-10-30 ENCOUNTER — TRANSFERRED RECORDS (OUTPATIENT)
Dept: HEALTH INFORMATION MANAGEMENT | Facility: CLINIC | Age: 73
End: 2023-10-30
Payer: COMMERCIAL

## 2023-11-01 ENCOUNTER — TELEPHONE (OUTPATIENT)
Dept: FAMILY MEDICINE | Facility: CLINIC | Age: 73
End: 2023-11-01
Payer: COMMERCIAL

## 2023-11-01 NOTE — TELEPHONE ENCOUNTER
Patient Quality Outreach    Patient is due for the following:   Physical Annual Wellness Visit    Next Steps:   Patient was scheduled for 11/9/23    Type of outreach:    Phone, spoke to patient/parent. pt      Questions for provider review:    None           Lolis Ruggiero MA        Do Not Resuscitate (DNR)

## 2023-11-09 ENCOUNTER — OFFICE VISIT (OUTPATIENT)
Dept: FAMILY MEDICINE | Facility: CLINIC | Age: 73
End: 2023-11-09
Payer: COMMERCIAL

## 2023-11-09 VITALS
OXYGEN SATURATION: 99 % | HEART RATE: 45 BPM | DIASTOLIC BLOOD PRESSURE: 86 MMHG | RESPIRATION RATE: 16 BRPM | SYSTOLIC BLOOD PRESSURE: 158 MMHG | WEIGHT: 243.1 LBS | HEIGHT: 69 IN | TEMPERATURE: 97.4 F | BODY MASS INDEX: 36.01 KG/M2

## 2023-11-09 DIAGNOSIS — L40.9 PSORIASIS: ICD-10-CM

## 2023-11-09 DIAGNOSIS — Z29.11 NEED FOR VACCINATION AGAINST RESPIRATORY SYNCYTIAL VIRUS: ICD-10-CM

## 2023-11-09 DIAGNOSIS — E11.9 TYPE 2 DIABETES MELLITUS WITHOUT COMPLICATION, WITHOUT LONG-TERM CURRENT USE OF INSULIN (H): ICD-10-CM

## 2023-11-09 DIAGNOSIS — E66.01 CLASS 2 SEVERE OBESITY DUE TO EXCESS CALORIES WITH SERIOUS COMORBIDITY AND BODY MASS INDEX (BMI) OF 36.0 TO 36.9 IN ADULT (H): ICD-10-CM

## 2023-11-09 DIAGNOSIS — H26.9 CATARACT OF LEFT EYE, UNSPECIFIED CATARACT TYPE: ICD-10-CM

## 2023-11-09 DIAGNOSIS — I10 ESSENTIAL HYPERTENSION: ICD-10-CM

## 2023-11-09 DIAGNOSIS — Z00.00 ENCOUNTER FOR MEDICARE ANNUAL WELLNESS EXAM: Primary | ICD-10-CM

## 2023-11-09 DIAGNOSIS — E78.5 DYSLIPIDEMIA: ICD-10-CM

## 2023-11-09 DIAGNOSIS — N18.2 CHRONIC KIDNEY DISEASE, STAGE 2 (MILD): ICD-10-CM

## 2023-11-09 DIAGNOSIS — E66.812 CLASS 2 SEVERE OBESITY DUE TO EXCESS CALORIES WITH SERIOUS COMORBIDITY AND BODY MASS INDEX (BMI) OF 36.0 TO 36.9 IN ADULT (H): ICD-10-CM

## 2023-11-09 DIAGNOSIS — Z23 NEED FOR SHINGLES VACCINE: ICD-10-CM

## 2023-11-09 DIAGNOSIS — E88.810 METABOLIC SYNDROME: ICD-10-CM

## 2023-11-09 LAB
ALT SERPL W P-5'-P-CCNC: 24 U/L (ref 0–70)
ANION GAP SERPL CALCULATED.3IONS-SCNC: 9 MMOL/L (ref 7–15)
BUN SERPL-MCNC: 15.4 MG/DL (ref 8–23)
CALCIUM SERPL-MCNC: 9.2 MG/DL (ref 8.8–10.2)
CHLORIDE SERPL-SCNC: 103 MMOL/L (ref 98–107)
CHOLEST SERPL-MCNC: 101 MG/DL
CREAT SERPL-MCNC: 1.02 MG/DL (ref 0.67–1.17)
CREAT UR-MCNC: 28.7 MG/DL
DEPRECATED HCO3 PLAS-SCNC: 28 MMOL/L (ref 22–29)
EGFRCR SERPLBLD CKD-EPI 2021: 77 ML/MIN/1.73M2
GLUCOSE SERPL-MCNC: 101 MG/DL (ref 70–99)
HBA1C MFR BLD: 6 % (ref 0–5.6)
HDLC SERPL-MCNC: 37 MG/DL
HOLD SPECIMEN: NORMAL
HOLD SPECIMEN: NORMAL
LDLC SERPL CALC-MCNC: 36 MG/DL
MICROALBUMIN UR-MCNC: <12 MG/L
MICROALBUMIN/CREAT UR: NORMAL MG/G{CREAT}
NONHDLC SERPL-MCNC: 64 MG/DL
POTASSIUM SERPL-SCNC: 4.5 MMOL/L (ref 3.4–5.3)
SODIUM SERPL-SCNC: 140 MMOL/L (ref 135–145)
TRIGL SERPL-MCNC: 142 MG/DL

## 2023-11-09 PROCEDURE — 99214 OFFICE O/P EST MOD 30 MIN: CPT | Mod: 25 | Performed by: FAMILY MEDICINE

## 2023-11-09 PROCEDURE — G0008 ADMIN INFLUENZA VIRUS VAC: HCPCS | Performed by: FAMILY MEDICINE

## 2023-11-09 PROCEDURE — 80048 BASIC METABOLIC PNL TOTAL CA: CPT | Performed by: FAMILY MEDICINE

## 2023-11-09 PROCEDURE — 91320 SARSCV2 VAC 30MCG TRS-SUC IM: CPT | Performed by: FAMILY MEDICINE

## 2023-11-09 PROCEDURE — 83036 HEMOGLOBIN GLYCOSYLATED A1C: CPT | Performed by: FAMILY MEDICINE

## 2023-11-09 PROCEDURE — 82570 ASSAY OF URINE CREATININE: CPT | Performed by: FAMILY MEDICINE

## 2023-11-09 PROCEDURE — 90480 ADMN SARSCOV2 VAC 1/ONLY CMP: CPT | Performed by: FAMILY MEDICINE

## 2023-11-09 PROCEDURE — 84460 ALANINE AMINO (ALT) (SGPT): CPT | Performed by: FAMILY MEDICINE

## 2023-11-09 PROCEDURE — 82043 UR ALBUMIN QUANTITATIVE: CPT | Performed by: FAMILY MEDICINE

## 2023-11-09 PROCEDURE — G0439 PPPS, SUBSEQ VISIT: HCPCS | Performed by: FAMILY MEDICINE

## 2023-11-09 PROCEDURE — 36415 COLL VENOUS BLD VENIPUNCTURE: CPT | Performed by: FAMILY MEDICINE

## 2023-11-09 PROCEDURE — 90662 IIV NO PRSV INCREASED AG IM: CPT | Performed by: FAMILY MEDICINE

## 2023-11-09 PROCEDURE — 80061 LIPID PANEL: CPT | Performed by: FAMILY MEDICINE

## 2023-11-09 RX ORDER — LISINOPRIL 40 MG/1
40 TABLET ORAL DAILY
Qty: 90 TABLET | Refills: 3 | Status: SHIPPED | OUTPATIENT
Start: 2023-11-09

## 2023-11-09 RX ORDER — HYDROCHLOROTHIAZIDE 25 MG/1
25 TABLET ORAL DAILY
Qty: 90 TABLET | Refills: 1 | Status: SHIPPED | OUTPATIENT
Start: 2023-11-09 | End: 2024-04-29

## 2023-11-09 RX ORDER — METFORMIN HCL 500 MG
TABLET, EXTENDED RELEASE 24 HR ORAL
Qty: 360 TABLET | Refills: 3 | Status: SHIPPED | OUTPATIENT
Start: 2023-11-09

## 2023-11-09 RX ORDER — RESPIRATORY SYNCYTIAL VIRUS VACCINE 120MCG/0.5
0.5 KIT INTRAMUSCULAR ONCE
Qty: 1 EACH | Refills: 0 | Status: CANCELLED | OUTPATIENT
Start: 2023-11-09 | End: 2023-11-09

## 2023-11-09 RX ORDER — PREDNISOLONE ACETATE 10 MG/ML
SUSPENSION/ DROPS OPHTHALMIC
COMMUNITY
Start: 2023-10-30

## 2023-11-09 RX ORDER — ATORVASTATIN CALCIUM 80 MG/1
80 TABLET, FILM COATED ORAL DAILY
Qty: 90 TABLET | Refills: 3 | Status: SHIPPED | OUTPATIENT
Start: 2023-11-09

## 2023-11-09 RX ORDER — LATANOPROST 50 UG/ML
SOLUTION/ DROPS OPHTHALMIC
COMMUNITY
Start: 2023-09-29

## 2023-11-09 RX ORDER — DORZOLAMIDE HYDROCHLORIDE AND TIMOLOL MALEATE 20; 5 MG/ML; MG/ML
SOLUTION/ DROPS OPHTHALMIC
COMMUNITY
Start: 2023-10-26

## 2023-11-09 RX ORDER — KETOROLAC TROMETHAMINE 5 MG/ML
SOLUTION OPHTHALMIC
COMMUNITY
Start: 2023-09-25

## 2023-11-09 ASSESSMENT — ENCOUNTER SYMPTOMS
FREQUENCY: 0
DYSURIA: 0
HEMATURIA: 0
ABDOMINAL PAIN: 0
ARTHRALGIAS: 0
CONSTIPATION: 0
HEARTBURN: 0
MYALGIAS: 0
SHORTNESS OF BREATH: 0
HEMATOCHEZIA: 0
COUGH: 0
JOINT SWELLING: 0
EYE PAIN: 0
SORE THROAT: 0
DIZZINESS: 0
NAUSEA: 0
FEVER: 0
DIARRHEA: 0
PALPITATIONS: 0
CHILLS: 0
HEADACHES: 0
PARESTHESIAS: 0
NERVOUS/ANXIOUS: 0

## 2023-11-09 ASSESSMENT — ACTIVITIES OF DAILY LIVING (ADL): CURRENT_FUNCTION: NO ASSISTANCE NEEDED

## 2023-11-09 NOTE — LETTER
November 11, 2023      Kranthi Crow  [address] [address] [address]        Dear ,    We are writing to inform you of your test results.    All testing is stable and improved.  Great to see you this week!    Resulted Orders   HEMOGLOBIN A1C   Result Value Ref Range    Hemoglobin A1C 6.0 (H) 0.0 - 5.6 %      Comment:      Normal <5.7%   Prediabetes 5.7-6.4%    Diabetes 6.5% or higher     Note: Adopted from ADA consensus guidelines.   ALT   Result Value Ref Range    ALT 24 0 - 70 U/L      Comment:      Reference intervals for this test were updated on 6/12/2023 to more accurately reflect our healthy population. There may be differences in the flagging of prior results with similar values performed with this method. Interpretation of those prior results can be made in the context of the updated reference intervals.     Basic metabolic panel  (Ca, Cl, CO2, Creat, Gluc, K, Na, BUN)   Result Value Ref Range    Sodium 140 135 - 145 mmol/L      Comment:      Reference intervals for this test were updated on 09/26/2023 to more accurately reflect our healthy population. There may be differences in the flagging of prior results with similar values performed with this method. Interpretation of those prior results can be made in the context of the updated reference intervals.     Potassium 4.5 3.4 - 5.3 mmol/L    Chloride 103 98 - 107 mmol/L    Carbon Dioxide (CO2) 28 22 - 29 mmol/L    Anion Gap 9 7 - 15 mmol/L    Urea Nitrogen 15.4 8.0 - 23.0 mg/dL    Creatinine 1.02 0.67 - 1.17 mg/dL    GFR Estimate 77 >60 mL/min/1.73m2    Calcium 9.2 8.8 - 10.2 mg/dL    Glucose 101 (H) 70 - 99 mg/dL   Lipid panel reflex to direct LDL Fasting   Result Value Ref Range    Cholesterol 101 <200 mg/dL    Triglycerides 142 <150 mg/dL    Direct Measure HDL 37 (L) >=40 mg/dL    LDL Cholesterol Calculated 36 <=100 mg/dL    Non HDL Cholesterol 64 <130 mg/dL    Narrative    Cholesterol  Desirable:  <200 mg/dL    Triglycerides  Normal:   Less than 150 mg/dL  Borderline High:  150-199 mg/dL  High:  200-499 mg/dL  Very High:  Greater than or equal to 500 mg/dL    Direct Measure HDL  Female:  Greater than or equal to 50 mg/dL   Male:  Greater than or equal to 40 mg/dL    LDL Cholesterol  Desirable:  <100mg/dL  Above Desirable:  100-129 mg/dL   Borderline High:  130-159 mg/dL   High:  160-189 mg/dL   Very High:  >= 190 mg/dL    Non HDL Cholesterol  Desirable:  130 mg/dL  Above Desirable:  130-159 mg/dL  Borderline High:  160-189 mg/dL  High:  190-219 mg/dL  Very High:  Greater than or equal to 220 mg/dL   Albumin Random Urine Quantitative with Creat Ratio   Result Value Ref Range    Creatinine Urine mg/dL 28.7 mg/dL      Comment:      The reference ranges have not been established in urine creatinine. The results should be integrated into the clinical context for interpretation.    Albumin Urine mg/L <12.0 mg/L      Comment:      The reference ranges have not been established in urine albumin. The results should be integrated into the clinical context for interpretation.    Albumin Urine mg/g Cr        Comment:      Unable to calculate, urine albumin and/or urine creatinine is outside detectable limits.  Microalbuminuria is defined as an albumin:creatinine ratio of 17 to 299 for males and 25 to 299 for females. A ratio of albumin:creatinine of 300 or higher is indicative of overt proteinuria.  Due to biologic variability, positive results should be confirmed by a second, first-morning random or 24-hour timed urine specimen. If there is discrepancy, a third specimen is recommended. When 2 out of 3 results are in the microalbuminuria range, this is evidence for incipient nephropathy and warrants increased efforts at glucose control, blood pressure control, and institution of therapy with an angiotensin-converting-enzyme (ACE) inhibitor (if the patient can tolerate it).         If you have any questions or concerns, please call the clinic at the number  listed above.       Sincerely,      Venkata Benson MD

## 2023-11-09 NOTE — PATIENT INSTRUCTIONS
Patient Education   Personalized Prevention Plan  You are due for the preventive services outlined below.  Your care team is available to assist you in scheduling these services.  If you have already completed any of these items, please share that information with your care team to update in your medical record.  Health Maintenance Due   Topic Date Due     Zoster (Shingles) Vaccine (1 of 2) Never done     RSV VACCINE (Pregnancy & 60+) (1 - 1-dose 60+ series) Never done     LUNG CANCER SCREENING  02/01/2009     Cholesterol Lab  11/10/2018     Diabetic Foot Exam  05/20/2022     Flu Vaccine (1) 09/01/2023     COVID-19 Vaccine (5 - 2023-24 season) 09/01/2023     Kidney Microalbumin Urine Test  11/09/2023     ANNUAL REVIEW OF HM ORDERS  11/09/2023

## 2023-11-09 NOTE — ASSESSMENT & PLAN NOTE
Annual exam.  He has a number of stable health conditions as discussed elsewhere.  Overall doing well.  Now transition to living in [place].  Tries to remain active.

## 2023-11-09 NOTE — ASSESSMENT & PLAN NOTE
This patient's diabetes is controlled.     -Hemoglobin A1c: controlled   -blood pressure: not controlled   -statin: Yes   -aspirin: YES   -tobacco status:   Tobacco Use      Smoking status: Former        Passive exposure: Never      Smokeless tobacco: Never      Current Medications:   - medformin

## 2023-11-09 NOTE — PROGRESS NOTES
"SUBJECTIVE:   Kranthi is a 73 year old who presents for Preventive Visit.      11/9/2023     9:48 AM   Additional Questions   Roomed by xl     Chief Complaint   Patient presents with    Wellness Visit     Struggling with eye function. Is working with eye care.  He had to have cataract replaced int he left eye.  \"Sensation like he is looking through vaseline.\"     \"I have gratitude.  I feel healthy.\"     DM: no concerns today.      HTN: no lightheadedness.  No dizziness.      No LUTS    Are you in the first 12 months of your Medicare coverage?  No    Healthy Habits:     In general, how would you rate your overall health?  Excellent    Frequency of exercise:  4-5 days/week    Duration of exercise:  15-30 minutes    Do you usually eat at least 4 servings of fruit and vegetables a day, include whole grains    & fiber and avoid regularly eating high fat or \"junk\" foods?  Yes    Taking medications regularly:  Yes    Medication side effects:  None    Ability to successfully perform activities of daily living:  No assistance needed    Home Safety:  No safety concerns identified    Hearing Impairment:  No hearing concerns    In the past 6 months, have you been bothered by leaking of urine?  No    In general, how would you rate your overall mental or emotional health?  Excellent    Additional concerns today:  No    Today's PHQ-2 Score:       11/9/2023     9:35 AM   PHQ-2 ( 1999 [employer])   Q1: Little interest or pleasure in doing things 0   Q2: Feeling down, depressed or hopeless 0   PHQ-2 Score 0   Q1: Little interest or pleasure in doing things Not at all   Q2: Feeling down, depressed or hopeless Not at all   PHQ-2 Score 0     Have you ever done Advance Care Planning? (For example, a Health Directive, POLST, or a discussion with a medical provider or your loved ones about your wishes): Yes, patient states has an Advance Care Planning document and will bring a copy to the clinic.      Fall risk  Fallen 2 or more times in the past " year?: No  Any fall with injury in the past year?: No    Cognitive Screening   1) Repeat 3 items (Leader, Season, Table)  Normal  2) Clock draw: NORMAL  3) 3 item recall: Recalls 1 object   Results: NORMAL clock, 1-2 items recalled: COGNITIVE IMPAIRMENT LESS LIKELY    Mini-CogTM Copyright MORRO Griffith. Licensed by the author for use in [facility]; reprinted with permission (hue@[email or web address]). All rights reserved.        Reviewed and updated as needed this visit by clinical staff   Tobacco  Allergies  Meds  Problems  Med Hx  Surg Hx  Fam Hx          Reviewed and updated as needed this visit by Provider   Tobacco  Allergies  Meds  Problems  Med Hx  Surg Hx  Fam Hx         Social History     Tobacco Use    Smoking status: Former     Passive exposure: Never    Smokeless tobacco: Never   Substance Use Topics    Alcohol use: Yes     Alcohol/week: 0.0 standard drinks of alcohol     Comment: Alcoholic Drinks/day: 1 drink every 3-4 months              11/9/2023     9:34 AM   Alcohol Use   Prescreen: >3 drinks/day or >7 drinks/week? Not Applicable     Do you have a current opioid prescription? No  Do you use any other controlled substances or medications that are not prescribed by a provider? None    Current providers sharing in care for this patient include:   Patient Care Team:  Venkata Benson MD as PCP - General (Family Medicine)  Venkata Benson MD as Assigned PCP    The following health maintenance items are reviewed in Epic and correct as of today:  Health Maintenance   Topic Date Due    RSV VACCINE (Pregnancy & 60+) (1 - 1-dose 60+ series) Never done    LUNG CANCER SCREENING  02/01/2009    LIPID  11/10/2018    DIABETIC FOOT EXAM  05/20/2022    MICROALBUMIN  11/09/2023    A1C  05/09/2024    BMP  05/17/2024    EYE EXAM  10/30/2024    MEDICARE ANNUAL WELLNESS VISIT  11/09/2024    ANNUAL REVIEW OF HM ORDERS  11/09/2024    FALL RISK ASSESSMENT  11/09/2024    COLORECTAL CANCER SCREENING  09/02/2025  "   ADVANCE CARE PLANNING  11/09/2028    DTAP/TDAP/TD IMMUNIZATION (3 - Td or Tdap) 11/08/2031    HEPATITIS C SCREENING  Completed    PHQ-2 (once per calendar year)  Completed    INFLUENZA VACCINE  Completed    Pneumococcal Vaccine: 65+ Years  Completed    URINALYSIS  Completed    AORTIC ANEURYSM SCREENING (SYSTEM ASSIGNED)  Completed    COVID-19 Vaccine  Completed    IPV IMMUNIZATION  Aged Out    HPV IMMUNIZATION  Aged Out    MENINGITIS IMMUNIZATION  Aged Out    RSV MONOCLONAL ANTIBODY  Aged Out    ZOSTER IMMUNIZATION  Discontinued       Review of Systems   Constitutional:  Negative for chills and fever.   HENT:  Negative for congestion, ear pain, hearing loss and sore throat.    Eyes:  Positive for visual disturbance. Negative for pain.   Respiratory:  Negative for cough and shortness of breath.    Cardiovascular:  Negative for chest pain, palpitations and peripheral edema.   Gastrointestinal:  Negative for abdominal pain, constipation, diarrhea, heartburn, hematochezia and nausea.   Genitourinary:  Positive for urgency. Negative for dysuria, frequency, genital sores, hematuria, impotence and penile discharge.   Musculoskeletal:  Negative for arthralgias, joint swelling and myalgias.   Neurological:  Negative for dizziness, headaches and paresthesias.   Psychiatric/Behavioral:  Negative for mood changes. The patient is not nervous/anxious.      OBJECTIVE:   BP (!) 158/86 (BP Location: Left arm, Patient Position: Sitting, Cuff Size: Adult Large)   Pulse (!) 45   Temp 97.4  F (36.3  C) (Oral)   Resp 16   Ht 1.74 m (5' 8.5\")   Wt 110.3 kg (243 lb 1.6 oz)   SpO2 99%   BMI 36.43 kg/m   Estimated body mass index is 36.43 kg/m  as calculated from the following:    Height as of this encounter: 1.74 m (5' 8.5\").    Weight as of this encounter: 110.3 kg (243 lb 1.6 oz).  Physical Exam  Vitals reviewed.   Constitutional:       General: He is not in acute distress.     Appearance: Normal appearance. He is not " "ill-appearing.   HENT:      Head: Normocephalic and atraumatic.      Right Ear: External ear normal.      Left Ear: External ear normal.      Nose: Nose normal.      Mouth/Throat:      Pharynx: Oropharynx is clear. No oropharyngeal exudate or posterior oropharyngeal erythema.   Eyes:      General: No scleral icterus.        Right eye: No discharge.         Left eye: No discharge.      Extraocular Movements: Extraocular movements intact.      Conjunctiva/sclera: Conjunctivae normal.      Pupils: Pupils are equal, round, and reactive to light.   Neck:      Comments: No thyromegaly.  Cardiovascular:      Rate and Rhythm: Normal rate and regular rhythm.      Heart sounds: Normal heart sounds. No murmur heard.     No friction rub. No gallop.   Pulmonary:      Effort: Pulmonary effort is normal. No respiratory distress.      Breath sounds: Normal breath sounds. No wheezing or rales.   Abdominal:      General: There is no distension.      Palpations: Abdomen is soft. There is no mass.      Tenderness: There is no abdominal tenderness.   Musculoskeletal:         General: No signs of injury. Normal range of motion.      Cervical back: Normal range of motion.      Right lower leg: No edema.      Left lower leg: No edema.   Lymphadenopathy:      Cervical: No cervical adenopathy.   Skin:     General: Skin is warm.      Coloration: Skin is not jaundiced.      Findings: No rash.   Neurological:      General: No focal deficit present.      Mental Status: He is alert and oriented to person, place, and time.      Cranial Nerves: No cranial nerve deficit.      Deep Tendon Reflexes: Reflexes normal.   Psychiatric:         Mood and Affect: Mood normal.         Diagnostic Test Results:  Labs reviewed in Epic    ASSESSMENT / PLAN:     Problem List Items Addressed This Visit       Cataract of left eye, unspecified cataract type     Ongoing struggles in his left eye.  Working with speciality.  \"I am patient.\"          Chronic kidney " disease, stage 2 (mild)    Relevant Orders    Albumin Random Urine Quantitative with Creat Ratio    Class 2 severe obesity due to excess calories with serious comorbidity and body mass index (BMI) of 36.0 to 36.9 in adult (H)     Weight stable.  He continues to stay active.  Consider adding SGLT2 inhibitor given history of heart concerns as well as diabetes and CKD.         Relevant Medications    metFORMIN (GLUCOPHAGE XR) 500 MG 24 hr tablet    Dyslipidemia    Relevant Medications    atorvastatin (LIPITOR) 80 MG tablet    Other Relevant Orders    Basic metabolic panel  (Ca, Cl, CO2, Creat, Gluc, K, Na, BUN)    ALT    Encounter for Medicare annual wellness exam - Primary     Annual exam.  He has a number of stable health conditions as discussed elsewhere.  Overall doing well.  Now transition to living in [place].  Tries to remain active.         Essential hypertension     Elevated. Increase HCTZ.          Relevant Medications    hydrochlorothiazide (HYDRODIURIL) 25 MG tablet    lisinopril (ZESTRIL) 40 MG tablet    Other Relevant Orders    Basic metabolic panel  (Ca, Cl, CO2, Creat, Gluc, K, Na, BUN)    ALT    Metabolic syndrome    Psoriasis     Not a concern today.  Ongoing condition.         Relevant Medications    dorzolamide-timolol (COSOPT) 2-0.5 % ophthalmic solution    latanoprost (XALATAN) 0.005 % ophthalmic solution    prednisoLONE acetate (PRED FORTE) 1 % ophthalmic suspension    ketorolac (ACULAR) 0.5 % ophthalmic solution    Type 2 diabetes mellitus without complication, without long-term current use of insulin (H)     This patient's diabetes is controlled.     -Hemoglobin A1c: controlled   -blood pressure: not controlled   -statin: Yes   -aspirin: YES   -tobacco status:   Tobacco Use      Smoking status: Former        Passive exposure: Never      Smokeless tobacco: Never      Current Medications:   - medformin           Relevant Medications    metFORMIN (GLUCOPHAGE XR) 500 MG 24 hr tablet    Other  "Relevant Orders    Lipid panel reflex to direct LDL Fasting    HEMOGLOBIN A1C (Completed)     Other Visit Diagnoses       Need for shingles vaccine        Need for vaccination against respiratory syncytial virus              Patient has been advised of split billing requirements and indicates understanding: Yes    COUNSELING:  Reviewed preventive health counseling, as reflected in patient instructions       Regular exercise       Healthy diet/nutrition      BMI:   Estimated body mass index is 36.43 kg/m  as calculated from the following:    Height as of this encounter: 1.74 m (5' 8.5\").    Weight as of this encounter: 110.3 kg (243 lb 1.6 oz).   Weight management plan: Discussed healthy diet and exercise guidelines      He reports that he has quit smoking. He has never been exposed to tobacco smoke. He has never used smokeless tobacco.      Appropriate preventive services were discussed with this patient, including applicable screening as appropriate for fall prevention, nutrition, physical activity, Tobacco-use cessation, weight loss and cognition.  Checklist reviewing preventive services available has been given to the patient.    Reviewed patients plan of care and provided an AVS. The Basic Care Plan (routine screening as documented in Health Maintenance) for Alejandro meets the Care Plan requirement. This Care Plan has been established and reviewed with the Patient.      Venkata Benson MD  [facility]    Identified Health Risks:  I have reviewed Opioid Use Disorder and Substance Use Disorder risk factors and made any needed referrals.   "

## 2023-11-09 NOTE — ASSESSMENT & PLAN NOTE
Weight stable.  He continues to stay active.  Consider adding SGLT2 inhibitor given history of heart concerns as well as diabetes and CKD.

## 2023-11-28 ENCOUNTER — ALLIED HEALTH/NURSE VISIT (OUTPATIENT)
Dept: FAMILY MEDICINE | Facility: CLINIC | Age: 73
End: 2023-11-28
Payer: COMMERCIAL

## 2023-11-28 VITALS — DIASTOLIC BLOOD PRESSURE: 68 MMHG | SYSTOLIC BLOOD PRESSURE: 128 MMHG

## 2023-11-28 DIAGNOSIS — I10 ESSENTIAL HYPERTENSION: Primary | ICD-10-CM

## 2023-11-28 PROCEDURE — 99207 PR NO CHARGE NURSE ONLY: CPT

## 2023-11-28 ASSESSMENT — PAIN SCALES - GENERAL: PAINLEVEL: NO PAIN (0)

## 2023-11-28 NOTE — PROGRESS NOTES
I met with Alejandro Crow at the request of Dr Benson to recheck his blood pressure.  Blood pressure medications on the med list were reviewed with patient.    Patient has taken all medications as per usual regimen: Yes  Patient reports tolerating them without any issues or concerns: Yes    Vitals:    11/28/23 1128   BP: 128/68   BP Location: Left arm   Patient Position: Sitting   Cuff Size: Adult Large       Blood pressure was taken, previous encounter was reviewed, recorded blood pressure below 140/90.  Patient was discharged and the note will be sent to the provider for final review.

## 2023-11-28 NOTE — PROGRESS NOTES
This patient's blood pressure looks great with a recent measurement.  Please let him know that he should continue his current medication regimen as prescribed.

## 2024-03-11 ENCOUNTER — TRANSFERRED RECORDS (OUTPATIENT)
Dept: HEALTH INFORMATION MANAGEMENT | Facility: CLINIC | Age: 74
End: 2024-03-11
Payer: COMMERCIAL

## 2024-03-11 LAB — RETINOPATHY: NEGATIVE

## 2024-04-15 ENCOUNTER — TRANSFERRED RECORDS (OUTPATIENT)
Dept: HEALTH INFORMATION MANAGEMENT | Facility: CLINIC | Age: 74
End: 2024-04-15
Payer: COMMERCIAL

## 2024-04-15 LAB — RETINOPATHY: NEGATIVE

## 2024-04-29 DIAGNOSIS — I10 ESSENTIAL HYPERTENSION: ICD-10-CM

## 2024-04-29 RX ORDER — HYDROCHLOROTHIAZIDE 25 MG/1
25 TABLET ORAL DAILY
Qty: 90 TABLET | Refills: 1 | Status: SHIPPED | OUTPATIENT
Start: 2024-04-29

## 2024-06-24 ENCOUNTER — TRANSFERRED RECORDS (OUTPATIENT)
Dept: HEALTH INFORMATION MANAGEMENT | Facility: CLINIC | Age: 74
End: 2024-06-24
Payer: COMMERCIAL

## 2024-10-10 ENCOUNTER — PATIENT OUTREACH (OUTPATIENT)
Dept: CARE COORDINATION | Facility: CLINIC | Age: 74
End: 2024-10-10
Payer: COMMERCIAL

## 2024-10-28 ENCOUNTER — TRANSFERRED RECORDS (OUTPATIENT)
Dept: HEALTH INFORMATION MANAGEMENT | Facility: CLINIC | Age: 74
End: 2024-10-28
Payer: COMMERCIAL

## 2024-10-28 LAB — RETINOPATHY: NEGATIVE

## 2024-12-07 DIAGNOSIS — I10 ESSENTIAL HYPERTENSION: ICD-10-CM

## 2024-12-08 DIAGNOSIS — I10 ESSENTIAL HYPERTENSION: ICD-10-CM

## 2024-12-09 ENCOUNTER — OFFICE VISIT (OUTPATIENT)
Dept: FAMILY MEDICINE | Facility: CLINIC | Age: 74
End: 2024-12-09
Payer: COMMERCIAL

## 2024-12-09 VITALS
SYSTOLIC BLOOD PRESSURE: 139 MMHG | WEIGHT: 232.9 LBS | HEART RATE: 71 BPM | OXYGEN SATURATION: 99 % | HEIGHT: 69 IN | DIASTOLIC BLOOD PRESSURE: 89 MMHG | BODY MASS INDEX: 34.49 KG/M2 | TEMPERATURE: 98.4 F | RESPIRATION RATE: 14 BRPM

## 2024-12-09 DIAGNOSIS — E11.22 TYPE 2 DIABETES MELLITUS WITH STAGE 2 CHRONIC KIDNEY DISEASE, WITHOUT LONG-TERM CURRENT USE OF INSULIN (H): ICD-10-CM

## 2024-12-09 DIAGNOSIS — E66.811 CLASS 1 OBESITY DUE TO EXCESS CALORIES WITH SERIOUS COMORBIDITY AND BODY MASS INDEX (BMI) OF 34.0 TO 34.9 IN ADULT: ICD-10-CM

## 2024-12-09 DIAGNOSIS — L40.9 PSORIASIS: ICD-10-CM

## 2024-12-09 DIAGNOSIS — Z12.5 SCREENING FOR PROSTATE CANCER: ICD-10-CM

## 2024-12-09 DIAGNOSIS — I10 ESSENTIAL HYPERTENSION: ICD-10-CM

## 2024-12-09 DIAGNOSIS — N18.2 TYPE 2 DIABETES MELLITUS WITH STAGE 2 CHRONIC KIDNEY DISEASE, WITHOUT LONG-TERM CURRENT USE OF INSULIN (H): ICD-10-CM

## 2024-12-09 DIAGNOSIS — E78.5 DYSLIPIDEMIA: ICD-10-CM

## 2024-12-09 DIAGNOSIS — E66.09 CLASS 1 OBESITY DUE TO EXCESS CALORIES WITH SERIOUS COMORBIDITY AND BODY MASS INDEX (BMI) OF 34.0 TO 34.9 IN ADULT: ICD-10-CM

## 2024-12-09 DIAGNOSIS — E88.810 METABOLIC SYNDROME: ICD-10-CM

## 2024-12-09 DIAGNOSIS — Z00.00 ENCOUNTER FOR MEDICARE ANNUAL WELLNESS EXAM: Primary | ICD-10-CM

## 2024-12-09 DIAGNOSIS — H40.002 GLAUCOMA SUSPECT, LEFT: ICD-10-CM

## 2024-12-09 DIAGNOSIS — N18.2 CHRONIC KIDNEY DISEASE, STAGE 2 (MILD): ICD-10-CM

## 2024-12-09 PROBLEM — H35.359 CYSTOID MACULAR DEGENERATION: Status: ACTIVE | Noted: 2023-05-17

## 2024-12-09 LAB
EST. AVERAGE GLUCOSE BLD GHB EST-MCNC: 137 MG/DL
HBA1C MFR BLD: 6.4 % (ref 0–5.6)
HOLD SPECIMEN: NORMAL

## 2024-12-09 PROCEDURE — 84460 ALANINE AMINO (ALT) (SGPT): CPT | Performed by: FAMILY MEDICINE

## 2024-12-09 PROCEDURE — 99214 OFFICE O/P EST MOD 30 MIN: CPT | Mod: 25 | Performed by: FAMILY MEDICINE

## 2024-12-09 PROCEDURE — 83036 HEMOGLOBIN GLYCOSYLATED A1C: CPT | Performed by: FAMILY MEDICINE

## 2024-12-09 PROCEDURE — G0103 PSA SCREENING: HCPCS | Performed by: FAMILY MEDICINE

## 2024-12-09 PROCEDURE — G0008 ADMIN INFLUENZA VIRUS VAC: HCPCS | Performed by: FAMILY MEDICINE

## 2024-12-09 PROCEDURE — 36415 COLL VENOUS BLD VENIPUNCTURE: CPT | Performed by: FAMILY MEDICINE

## 2024-12-09 PROCEDURE — 90662 IIV NO PRSV INCREASED AG IM: CPT | Performed by: FAMILY MEDICINE

## 2024-12-09 PROCEDURE — 84450 TRANSFERASE (AST) (SGOT): CPT | Performed by: FAMILY MEDICINE

## 2024-12-09 PROCEDURE — 82570 ASSAY OF URINE CREATININE: CPT | Performed by: FAMILY MEDICINE

## 2024-12-09 PROCEDURE — G0439 PPPS, SUBSEQ VISIT: HCPCS | Performed by: FAMILY MEDICINE

## 2024-12-09 PROCEDURE — 82043 UR ALBUMIN QUANTITATIVE: CPT | Performed by: FAMILY MEDICINE

## 2024-12-09 PROCEDURE — 80048 BASIC METABOLIC PNL TOTAL CA: CPT | Performed by: FAMILY MEDICINE

## 2024-12-09 PROCEDURE — 80061 LIPID PANEL: CPT | Performed by: FAMILY MEDICINE

## 2024-12-09 RX ORDER — LISINOPRIL 40 MG/1
40 TABLET ORAL DAILY
Qty: 90 TABLET | Refills: 3 | Status: SHIPPED | OUTPATIENT
Start: 2024-12-09 | End: 2024-12-09

## 2024-12-09 RX ORDER — HYDROCHLOROTHIAZIDE 25 MG/1
25 TABLET ORAL DAILY
Qty: 90 TABLET | Refills: 3 | Status: SHIPPED | OUTPATIENT
Start: 2024-12-09

## 2024-12-09 RX ORDER — METFORMIN HYDROCHLORIDE 500 MG/1
TABLET, EXTENDED RELEASE ORAL
Qty: 360 TABLET | Refills: 3 | Status: SHIPPED | OUTPATIENT
Start: 2024-12-09

## 2024-12-09 RX ORDER — NETARSUDIL 0.2 MG/ML
1 SOLUTION/ DROPS OPHTHALMIC; TOPICAL AT BEDTIME
COMMUNITY
Start: 2024-10-17

## 2024-12-09 RX ORDER — LISINOPRIL 40 MG/1
40 TABLET ORAL DAILY
Qty: 90 TABLET | Refills: 3 | Status: SHIPPED | OUTPATIENT
Start: 2024-12-09

## 2024-12-09 RX ORDER — ATORVASTATIN CALCIUM 80 MG/1
80 TABLET, FILM COATED ORAL DAILY
Qty: 90 TABLET | Refills: 3 | Status: SHIPPED | OUTPATIENT
Start: 2024-12-09

## 2024-12-09 RX ORDER — BRIMONIDINE TARTRATE 2 MG/ML
1 SOLUTION/ DROPS OPHTHALMIC 2 TIMES DAILY
COMMUNITY
Start: 2024-09-13

## 2024-12-09 SDOH — HEALTH STABILITY: PHYSICAL HEALTH: ON AVERAGE, HOW MANY DAYS PER WEEK DO YOU ENGAGE IN MODERATE TO STRENUOUS EXERCISE (LIKE A BRISK WALK)?: 4 DAYS

## 2024-12-09 ASSESSMENT — ENCOUNTER SYMPTOMS
ARTHRALGIAS: 0
COLOR CHANGE: 0
DYSURIA: 0
VOMITING: 0
FEVER: 0
BACK PAIN: 0
SHORTNESS OF BREATH: 0
SORE THROAT: 0
SEIZURES: 0
PALPITATIONS: 0
ABDOMINAL PAIN: 0
HEMATURIA: 0
COUGH: 0
EYE PAIN: 0
CHILLS: 0

## 2024-12-09 ASSESSMENT — SOCIAL DETERMINANTS OF HEALTH (SDOH): HOW OFTEN DO YOU GET TOGETHER WITH FRIENDS OR RELATIVES?: MORE THAN THREE TIMES A WEEK

## 2024-12-09 NOTE — ASSESSMENT & PLAN NOTE
This patient's diabetes is controlled.  No hypoglycemia.  GI upset with metformin?   -Hemoglobin A1c: controlled   -blood pressure: controlled   -statin: Yes   -aspirin: YES   -tobacco status: quit 25 years ago

## 2024-12-09 NOTE — PROGRESS NOTES
Preventive Care Visit  [facility] CHELY Benson MD, Family Medicine  Dec 9, 2024      Assessment & Plan   Problem List Items Addressed This Visit       Chronic kidney disease, stage 2 (mild)     Check BMP.          Relevant Orders    BASIC METABOLIC PANEL    Albumin Random Urine Quantitative with Creat Ratio    Class 1 obesity due to excess calories with serious comorbidity and body mass index (BMI) of 34.0 to 34.9 in adult     General downward trend in weight.  Reviewed nutrition.          Relevant Medications    metFORMIN (GLUCOPHAGE XR) 500 MG 24 hr tablet    Dyslipidemia    Relevant Medications    atorvastatin (LIPITOR) 80 MG tablet    Other Relevant Orders    Lipid panel reflex to direct LDL Non-fasting    ALT    AST    Encounter for Medicare annual wellness exam - Primary     Annual exam.  Doing well.  Some obstructive symptoms. Will check PSA given symptoms.           Essential hypertension     Stable.  Refills. Check BMP.          Relevant Medications    lisinopril (ZESTRIL) 40 MG tablet    Other Relevant Orders    Lipid panel reflex to direct LDL Non-fasting    Glaucoma suspect, left     Follows with eye doctor. Multiple conditions that they are monitoring.          Metabolic syndrome     Stable.          Psoriasis     Stable. Some aches and pains.           Relevant Medications    brimonidine (ALPHAGAN) 0.2 % ophthalmic solution    RHOPRESSA 0.02 % ophthalmic solution    Type 2 diabetes mellitus with stage 2 chronic kidney disease, without long-term current use of insulin (H)     This patient's diabetes is controlled.  No hypoglycemia.  GI upset with metformin?   -Hemoglobin A1c: controlled   -blood pressure: controlled   -statin: Yes   -aspirin: YES   -tobacco status: quit 25 years ago           Relevant Medications    metFORMIN (GLUCOPHAGE XR) 500 MG 24 hr tablet     Other Visit Diagnoses       Screening for prostate cancer        Relevant Orders    PSA, screen          "    Patient has been advised of split billing requirements and indicates understanding: Yes       BMI  Estimated body mass index is 34.9 kg/m  as calculated from the following:    Height as of this encounter: 1.74 m (5' 8.5\").    Weight as of this encounter: 105.6 kg (232 lb 14.4 oz).   Weight management plan: Discussed healthy diet and exercise guidelines    Counseling  Appropriate preventive services were addressed with this patient via screening, questionnaire, or discussion as appropriate for fall prevention, nutrition, physical activity, Tobacco-use cessation, social engagement, weight loss and cognition.  Checklist reviewing preventive services available has been given to the patient.  Reviewed patient's diet, addressing concerns and/or questions.   The patient was instructed to see the dentist every 6 months.     Subjective   Kranthi is a 74 year old, presenting for the following:  Physical        11/9/2023     9:48 AM   Additional Questions   Roomed by xl           Urination:    - some unpredictability of urination.  \"All of sudden.\"      DM: intermittent loose stools.       Health Care Directive  Patient does not have a Health Care Directive: Patient states has Advance Directive and will bring in a copy to clinic.      12/9/2024   General Health   How would you rate your overall physical health? Excellent   Feel stress (tense, anxious, or unable to sleep) Not at all            12/9/2024   Nutrition   Diet: Regular (no restrictions)            12/9/2024   Exercise   Days per week of moderate/strenous exercise 4 days            12/9/2024   Social Factors   Frequency of gathering with friends or relatives More than three times a week   Worry food won't last until get money to buy more No   Food not last or not have enough money for food? No   Do you have housing? (Housing is defined as stable permanent housing and does not include staying ouside in a car, in a tent, in an abandoned building, in an overnight " shelter, or couch-surfing.) Yes   Are you worried about losing your housing? No   Lack of transportation? No   Unable to get utilities (heat,electricity)? No            12/9/2024   Fall Risk   Fallen 2 or more times in the past year? No     No    Trouble with walking or balance? No     No        Patient-reported    Multiple values from one day are sorted in reverse-chronological order          12/9/2024   Activities of Daily Living- Home Safety   Needs help with the following daily activites None of the above   Safety concerns in the home None of the above            12/9/2024   Dental   Dentist two times every year? (!) NO            12/9/2024   Hearing Screening   Hearing concerns? None of the above            12/9/2024   Driving Risk Screening   Patient/family members have concerns about driving No            12/9/2024   General Alertness/Fatigue Screening   Have you been more tired than usual lately? No            12/9/2024   Urinary Incontinence Screening   Bothered by leaking urine in past 6 months No            12/9/2024   TB Screening   Were you born outside of the [place]? No            Today's PHQ-2 Score:       12/9/2024    11:51 AM   PHQ-2 ( 1999 [employer])   Q1: Little interest or pleasure in doing things 0    Q2: Feeling down, depressed or hopeless 0    PHQ-2 Score 0    Q1: Little interest or pleasure in doing things Not at all   Q2: Feeling down, depressed or hopeless Not at all   PHQ-2 Score 0       Patient-reported           12/9/2024   Substance Use   Alcohol more than 3/day or more than 7/wk No   Do you have a current opioid prescription? No   How severe/bad is pain from 1 to 10? 0/10 (No Pain)   Do you use any other substances recreationally? No        Social History     Tobacco Use    Smoking status: Former     Passive exposure: Never    Smokeless tobacco: Never   Vaping Use    Vaping status: Never Used   Substance Use Topics    Alcohol use: Yes     Alcohol/week: 0.0 standard drinks of alcohol      Comment: Alcoholic Drinks/day: 1 drink every 3-4 months     Drug use: No       ASCVD Risk   The ASCVD Risk score (Nena HAWKINS, et al., 2019) failed to calculate for the following reasons:    The valid total cholesterol range is 130 to 320 mg/dL          Reviewed and updated as needed this visit by Provider   Tobacco  Allergies  Meds  Problems  Med Hx  Surg Hx  Fam Hx            Patient Active Problem List   Diagnosis    Dyslipidemia    Psoriasis    Type 2 diabetes mellitus with stage 2 chronic kidney disease, without long-term current use of insulin (H)    Skin lesion of face    Chronic kidney disease, stage 2 (mild)    History of pulmonary embolism    Class 1 obesity due to excess calories with serious comorbidity and body mass index (BMI) of 34.0 to 34.9 in adult    Metabolic syndrome    Encounter for Medicare annual wellness exam    Benign neoplasm of transverse colon    Essential hypertension    Glaucoma suspect, left     Past Surgical History:   Procedure Laterality Date    CATARACT EXTRACTION      bilateral eyes     CATARACT IOL, RT/LT      Cataract IOL RT/LT    IR MISCELLANEOUS PROCEDURE  1/28/2008    IR MISCELLANEOUS PROCEDURE  2/19/2008    TOTAL KNEE ARTHROPLASTY      both knees        Social History     Tobacco Use    Smoking status: Former     Passive exposure: Never    Smokeless tobacco: Never   Substance Use Topics    Alcohol use: Yes     Alcohol/week: 0.0 standard drinks of alcohol     Comment: Alcoholic Drinks/day: 1 drink every 3-4 months      Family History   Problem Relation Age of Onset    Cancer - colorectal Father     Breast Cancer Mother     Dementia Mother     Hypertension Mother     Macular Degeneration Father     Heart Failure Father     Other - See Comments Father         kidney issues     No Known Problems Sister     Cerebrovascular Disease Paternal Grandmother     Colon Cancer No family hx of     Prostate Cancer No family hx of          Current providers sharing in care for  this patient include:  Patient Care Team:  Venkata Benson MD as PCP - General (Family Medicine)  Venkata Benson MD as Assigned PCP  Lee Chowdhury MD as MD (Ophthalmology)    The following health maintenance items are reviewed in Epic and correct as of today:  Health Maintenance   Topic Date Due    DIABETIC FOOT EXAM  05/20/2022    RSV VACCINE (1 - 1-dose 75+ series) Never done    COVID-19 Vaccine (6 - 2024-25 season) 09/01/2024    BMP  11/09/2024    LIPID  11/09/2024    MICROALBUMIN  11/09/2024    A1C  06/09/2025    COLORECTAL CANCER SCREENING  09/02/2025    EYE EXAM  10/28/2025    MEDICARE ANNUAL WELLNESS VISIT  12/09/2025    ANNUAL REVIEW OF HM ORDERS  12/09/2025    FALL RISK ASSESSMENT  12/09/2025    ADVANCE CARE PLANNING  12/09/2029    DTAP/TDAP/TD IMMUNIZATION (3 - Td or Tdap) 11/08/2031    HEPATITIS C SCREENING  Completed    PHQ-2 (once per calendar year)  Completed    INFLUENZA VACCINE  Completed    Pneumococcal Vaccine: 65+ Years  Completed    URINALYSIS  Completed    HPV IMMUNIZATION  Aged Out    MENINGITIS IMMUNIZATION  Aged Out    RSV MONOCLONAL ANTIBODY  Aged Out    ZOSTER IMMUNIZATION  Discontinued    LUNG CANCER SCREENING  Discontinued     Review of Systems   Constitutional:  Negative for chills and fever.   HENT:  Negative for ear pain and sore throat.    Eyes:  Negative for pain and visual disturbance.   Respiratory:  Negative for cough and shortness of breath.    Cardiovascular:  Negative for chest pain and palpitations.   Gastrointestinal:  Negative for abdominal pain and vomiting.   Genitourinary:  Negative for dysuria and hematuria.   Musculoskeletal:  Negative for arthralgias and back pain.   Skin:  Negative for color change and rash.   Neurological:  Negative for seizures and syncope.   All other systems reviewed and are negative.       Objective    Exam  /89 (BP Location: Left arm, Patient Position: Sitting, Cuff Size: Adult Large)   Pulse 71   Temp 98.4  F (36.9  C)  "(Oral)   Resp 14   Ht 1.74 m (5' 8.5\")   Wt 105.6 kg (232 lb 14.4 oz)   SpO2 99%   BMI 34.90 kg/m     Estimated body mass index is 34.9 kg/m  as calculated from the following:    Height as of this encounter: 1.74 m (5' 8.5\").    Weight as of this encounter: 105.6 kg (232 lb 14.4 oz).    Physical Exam  Vitals reviewed.   Constitutional:       General: He is not in acute distress.     Appearance: Normal appearance. He is not ill-appearing.   HENT:      Head: Normocephalic and atraumatic.      Right Ear: External ear normal.      Left Ear: External ear normal.      Nose: Nose normal.      Mouth/Throat:      Pharynx: Oropharynx is clear. No oropharyngeal exudate or posterior oropharyngeal erythema.   Eyes:      General: No scleral icterus.        Right eye: No discharge.         Left eye: No discharge.      Extraocular Movements: Extraocular movements intact.      Conjunctiva/sclera: Conjunctivae normal.      Pupils: Pupils are equal, round, and reactive to light.   Neck:      Comments: No thyromegaly.  Cardiovascular:      Rate and Rhythm: Normal rate and regular rhythm.      Heart sounds: Normal heart sounds. No murmur heard.     No friction rub. No gallop.   Pulmonary:      Effort: Pulmonary effort is normal. No respiratory distress.      Breath sounds: Normal breath sounds. No wheezing or rales.   Abdominal:      General: There is no distension.      Palpations: Abdomen is soft. There is no mass.      Tenderness: There is no abdominal tenderness.   Musculoskeletal:         General: No signs of injury. Normal range of motion.      Cervical back: Normal range of motion.      Right lower leg: No edema.      Left lower leg: No edema.   Lymphadenopathy:      Cervical: No cervical adenopathy.   Skin:     General: Skin is warm.      Coloration: Skin is not jaundiced.      Findings: No rash.   Neurological:      General: No focal deficit present.      Mental Status: He is alert and oriented to person, place, and time. "      Cranial Nerves: No cranial nerve deficit.      Deep Tendon Reflexes: Reflexes normal.   Psychiatric:         Mood and Affect: Mood normal.           12/9/2024   Mini Cog   Clock Draw Score 2 Normal   3 Item Recall 3 objects recalled   Mini Cog Total Score 5                 Signed Electronically by: Venkata Benson MD

## 2024-12-09 NOTE — PATIENT INSTRUCTIONS
Patient Education   Preventive Care Advice   This is general advice given by our system to help you stay healthy. However, your care team may have specific advice just for you. Please talk to your care team about your preventive care needs.  Nutrition  Eat 5 or more servings of fruits and vegetables each day.  Try wheat bread, brown rice and whole grain pasta (instead of white bread, rice, and pasta).  Get enough calcium and vitamin D. Check the label on foods and aim for 100% of the RDA (recommended daily allowance).  Lifestyle  Exercise at least 150 minutes each week  (30 minutes a day, 5 days a week).  Do muscle strengthening activities 2 days a week. These help control your weight and prevent disease.  No smoking.  Wear sunscreen to prevent skin cancer.  Have a dental exam and cleaning every 6 months.  Yearly exams  See your health care team every year to talk about:  Any changes in your health.  Any medicines your care team has prescribed.  Preventive care, family planning, and ways to prevent chronic diseases.  Shots (vaccines)   HPV shots (up to age 26), if you've never had them before.  Hepatitis B shots (up to age 59), if you've never had them before.  COVID-19 shot: Get this shot when it's due.  Flu shot: Get a flu shot every year.  Tetanus shot: Get a tetanus shot every 10 years.  Pneumococcal, hepatitis A, and RSV shots: Ask your care team if you need these based on your risk.  Shingles shot (for age 50 and up)  General health tests  Diabetes screening:  Starting at age 35, Get screened for diabetes at least every 3 years.  If you are younger than age 35, ask your care team if you should be screened for diabetes.  Cholesterol test: At age 39, start having a cholesterol test every 5 years, or more often if advised.  Bone density scan (DEXA): At age 50, ask your care team if you should have this scan for osteoporosis (brittle bones).  Hepatitis C: Get tested at least once in your life.  STIs (sexually  transmitted infections)  Before age 24: Ask your care team if you should be screened for STIs.  After age 24: Get screened for STIs if you're at risk. You are at risk for STIs (including HIV) if:  You are sexually active with more than one person.  You don't use condoms every time.  You or a partner was diagnosed with a sexually transmitted infection.  If you are at risk for HIV, ask about PrEP medicine to prevent HIV.  Get tested for HIV at least once in your life, whether you are at risk for HIV or not.  Cancer screening tests  Cervical cancer screening: If you have a cervix, begin getting regular cervical cancer screening tests starting at age 21.  Breast cancer scan (mammogram): If you've ever had breasts, begin having regular mammograms starting at age 40. This is a scan to check for breast cancer.  Colon cancer screening: It is important to start screening for colon cancer at age 45.  Have a colonoscopy test every 10 years (or more often if you're at risk) Or, ask your provider about stool tests like a FIT test every year or Cologuard test every 3 years.  To learn more about your testing options, visit:   .  For help making a decision, visit:   https://bit.ly/qd57642.  Prostate cancer screening test: If you have a prostate, ask your care team if a prostate cancer screening test (PSA) at age 55 is right for you.  Lung cancer screening: If you are a current or former smoker ages 50 to 80, ask your care team if ongoing lung cancer screenings are right for you.  For informational purposes only. Not to replace the advice of your health care provider. Copyright   2023 [employer]. All rights reserved. Clinically reviewed by the [facility] Transitions Program. [employer] [id number] - REV 01/24.

## 2024-12-10 LAB
ALT SERPL W P-5'-P-CCNC: 27 U/L (ref 0–70)
ANION GAP SERPL CALCULATED.3IONS-SCNC: 12 MMOL/L (ref 7–15)
AST SERPL W P-5'-P-CCNC: 30 U/L (ref 0–45)
BUN SERPL-MCNC: 24.2 MG/DL (ref 8–23)
CALCIUM SERPL-MCNC: 9.6 MG/DL (ref 8.8–10.4)
CHLORIDE SERPL-SCNC: 104 MMOL/L (ref 98–107)
CHOLEST SERPL-MCNC: 103 MG/DL
CREAT SERPL-MCNC: 1.19 MG/DL (ref 0.67–1.17)
CREAT UR-MCNC: 100 MG/DL
EGFRCR SERPLBLD CKD-EPI 2021: 63 ML/MIN/1.73M2
GLUCOSE SERPL-MCNC: 126 MG/DL (ref 70–99)
HCO3 SERPL-SCNC: 27 MMOL/L (ref 22–29)
HDLC SERPL-MCNC: 32 MG/DL
LDLC SERPL CALC-MCNC: 43 MG/DL
MICROALBUMIN UR-MCNC: 108 MG/L
MICROALBUMIN/CREAT UR: 108 MG/G CR (ref 0–17)
NONHDLC SERPL-MCNC: 71 MG/DL
POTASSIUM SERPL-SCNC: 4.4 MMOL/L (ref 3.4–5.3)
PSA SERPL DL<=0.01 NG/ML-MCNC: 1.99 NG/ML (ref 0–6.5)
SODIUM SERPL-SCNC: 143 MMOL/L (ref 135–145)
TRIGL SERPL-MCNC: 138 MG/DL

## 2024-12-23 ENCOUNTER — IMMUNIZATION (OUTPATIENT)
Dept: FAMILY MEDICINE | Facility: CLINIC | Age: 74
End: 2024-12-23
Payer: COMMERCIAL

## 2024-12-23 PROCEDURE — 90480 ADMN SARSCOV2 VAC 1/ONLY CMP: CPT

## 2024-12-23 PROCEDURE — 91320 SARSCV2 VAC 30MCG TRS-SUC IM: CPT

## 2025-02-11 ENCOUNTER — TELEPHONE (OUTPATIENT)
Dept: PHARMACY | Facility: OTHER | Age: 75
End: 2025-02-11
Payer: COMMERCIAL

## 2025-02-11 NOTE — TELEPHONE ENCOUNTER
EKATERINA Recruitment: [facility] insurance     Referral outreach attempt #1 on February 11, 2025      Outcome: left voicemail- Call back number [phone]    See Bahman BUCIO   [phone]

## 2025-04-21 ENCOUNTER — TRANSFERRED RECORDS (OUTPATIENT)
Dept: HEALTH INFORMATION MANAGEMENT | Facility: CLINIC | Age: 75
End: 2025-04-21
Payer: COMMERCIAL

## 2025-04-21 LAB — RETINOPATHY: NEGATIVE

## 2025-06-05 ENCOUNTER — TELEPHONE (OUTPATIENT)
Dept: FAMILY MEDICINE | Facility: CLINIC | Age: 75
End: 2025-06-05
Payer: COMMERCIAL

## 2025-06-05 NOTE — TELEPHONE ENCOUNTER
Patient Quality Outreach    Patient is due for the following:   Diabetes -  A1C, Diabetic Follow-Up Visit, and Foot Exam    Action(s) Taken:   Patient has upcoming appointment, these items will be addressed at that time.  Next appointment scheduled for 6/9/25. xl  Type of outreach:    Chart review performed, no outreach needed.    Questions for provider review:    None         Lolis Ruggiero MA  Chart routed to None.

## 2025-06-09 ENCOUNTER — OFFICE VISIT (OUTPATIENT)
Dept: FAMILY MEDICINE | Facility: CLINIC | Age: 75
End: 2025-06-09
Attending: FAMILY MEDICINE
Payer: COMMERCIAL

## 2025-06-09 VITALS
RESPIRATION RATE: 16 BRPM | TEMPERATURE: 97.4 F | DIASTOLIC BLOOD PRESSURE: 85 MMHG | BODY MASS INDEX: 33.99 KG/M2 | OXYGEN SATURATION: 98 % | HEART RATE: 72 BPM | HEIGHT: 69 IN | WEIGHT: 229.5 LBS | SYSTOLIC BLOOD PRESSURE: 127 MMHG

## 2025-06-09 DIAGNOSIS — E88.810 METABOLIC SYNDROME: ICD-10-CM

## 2025-06-09 DIAGNOSIS — E66.811 CLASS 1 OBESITY DUE TO EXCESS CALORIES WITH SERIOUS COMORBIDITY AND BODY MASS INDEX (BMI) OF 34.0 TO 34.9 IN ADULT: ICD-10-CM

## 2025-06-09 DIAGNOSIS — N18.2 CHRONIC KIDNEY DISEASE, STAGE 2 (MILD): ICD-10-CM

## 2025-06-09 DIAGNOSIS — E11.22 TYPE 2 DIABETES MELLITUS WITH STAGE 2 CHRONIC KIDNEY DISEASE, WITHOUT LONG-TERM CURRENT USE OF INSULIN (H): Primary | ICD-10-CM

## 2025-06-09 DIAGNOSIS — I10 ESSENTIAL HYPERTENSION: ICD-10-CM

## 2025-06-09 DIAGNOSIS — E66.09 CLASS 1 OBESITY DUE TO EXCESS CALORIES WITH SERIOUS COMORBIDITY AND BODY MASS INDEX (BMI) OF 34.0 TO 34.9 IN ADULT: ICD-10-CM

## 2025-06-09 DIAGNOSIS — N18.2 TYPE 2 DIABETES MELLITUS WITH STAGE 2 CHRONIC KIDNEY DISEASE, WITHOUT LONG-TERM CURRENT USE OF INSULIN (H): Primary | ICD-10-CM

## 2025-06-09 LAB
ANION GAP SERPL CALCULATED.3IONS-SCNC: 8 MMOL/L (ref 7–15)
BUN SERPL-MCNC: 22.6 MG/DL (ref 8–23)
CALCIUM SERPL-MCNC: 9.1 MG/DL (ref 8.8–10.4)
CHLORIDE SERPL-SCNC: 107 MMOL/L (ref 98–107)
CREAT SERPL-MCNC: 1.15 MG/DL (ref 0.67–1.17)
EGFRCR SERPLBLD CKD-EPI 2021: 66 ML/MIN/1.73M2
EST. AVERAGE GLUCOSE BLD GHB EST-MCNC: 137 MG/DL
GLUCOSE SERPL-MCNC: 152 MG/DL (ref 70–99)
HBA1C MFR BLD: 6.4 % (ref 0–5.6)
HCO3 SERPL-SCNC: 28 MMOL/L (ref 22–29)
HOLD SPECIMEN: NORMAL
HOLD SPECIMEN: NORMAL
POTASSIUM SERPL-SCNC: 4.6 MMOL/L (ref 3.4–5.3)
SODIUM SERPL-SCNC: 143 MMOL/L (ref 135–145)

## 2025-06-09 PROCEDURE — 3074F SYST BP LT 130 MM HG: CPT | Performed by: FAMILY MEDICINE

## 2025-06-09 PROCEDURE — 3079F DIAST BP 80-89 MM HG: CPT | Performed by: FAMILY MEDICINE

## 2025-06-09 PROCEDURE — 80048 BASIC METABOLIC PNL TOTAL CA: CPT | Performed by: FAMILY MEDICINE

## 2025-06-09 PROCEDURE — 3044F HG A1C LEVEL LT 7.0%: CPT | Performed by: FAMILY MEDICINE

## 2025-06-09 PROCEDURE — 99214 OFFICE O/P EST MOD 30 MIN: CPT | Performed by: FAMILY MEDICINE

## 2025-06-09 PROCEDURE — 36415 COLL VENOUS BLD VENIPUNCTURE: CPT | Performed by: FAMILY MEDICINE

## 2025-06-09 PROCEDURE — G2211 COMPLEX E/M VISIT ADD ON: HCPCS | Performed by: FAMILY MEDICINE

## 2025-06-09 PROCEDURE — 83036 HEMOGLOBIN GLYCOSYLATED A1C: CPT | Performed by: FAMILY MEDICINE

## 2025-06-09 NOTE — PROGRESS NOTES
"  Assessment & Plan     Class 1 obesity due to excess calories with serious comorbidity and body mass index (BMI) of 34.0 to 34.9 in adult  Weight stable.  Add GLP?  Role for SGLT2i?     Essential hypertension  BP controlled. No changes today. Check BMP.     Type 2 diabetes mellitus with stage 2 chronic kidney disease, without long-term current use of insulin (H)  DM stable.  No changes.  Continue current medications.  Consider adding SGLT2-i.     Metabolic syndrome  Stable.  GLP?    Chronic kidney disease, stage 2 (mild)  Measure creatinine today.  There was a change in December.  History of idiopathic kidney failure (with recovery) years ago.       BMI  Estimated body mass index is 34.39 kg/m  as calculated from the following:    Height as of this encounter: 1.74 m (5' 8.5\").    Weight as of this encounter: 104.1 kg (229 lb 8 oz).   Weight management plan: Discussed healthy diet and exercise guidelines    The longitudinal plan of care for the diagnosis(es)/condition(s) as documented were addressed during this visit. Due to the added complexity in care, I will continue to support Kranthi in the subsequent management and with ongoing continuity of care.    Subjective   Kranthi is a 75 year old, presenting for the following health issues:  RECHECK (6 month follow-up )        6/9/2025     9:29 AM   Additional Questions   Roomed by xl     Generally feels well.  Energy stable.  Weight down.  Can walk the golf course.  Feels good.  Enjoys family, nisa.    Kidney failure 20+ years ago.  NSAIDs? At that time.    No recent changes in medications  Eye has been improving (working with eye doctor and taking eye drops).     History of Present Illness       Diabetes:   He presents for follow up of diabetes.    He is not checking blood glucose.         He has no concerns regarding his diabetes at this time.   He is not experiencing numbness or burning in feet, excessive thirst, blurry vision, weight changes or redness, sores or blisters " "on feet.                    Objective    /85 (BP Location: Left arm, Patient Position: Sitting, Cuff Size: Adult Regular)   Pulse 72   Temp 97.4  F (36.3  C) (Oral)   Resp 16   Ht 1.74 m (5' 8.5\")   Wt 104.1 kg (229 lb 8 oz)   SpO2 98%   BMI 34.39 kg/m    Body mass index is 34.39 kg/m .  Physical Exam  Nursing note reviewed.   Constitutional:       General: He is not in acute distress.     Appearance: Normal appearance. He is not ill-appearing.   HENT:      Head: Normocephalic and atraumatic.   Eyes:      Extraocular Movements: Extraocular movements intact.      Conjunctiva/sclera: Conjunctivae normal.   Pulmonary:      Effort: Pulmonary effort is normal.   Neurological:      Mental Status: He is alert and oriented to person, place, and time.   Psychiatric:         Attention and Perception: Attention normal.         Mood and Affect: Mood normal.         Speech: Speech normal.         Thought Content: Thought content normal.             Signed Electronically by: Venkata Benson MD    "

## 2025-06-09 NOTE — ASSESSMENT & PLAN NOTE
Measure creatinine today.  There was a change in December.  History of idiopathic kidney failure (with recovery) years ago.

## 2025-06-10 ENCOUNTER — RESULTS FOLLOW-UP (OUTPATIENT)
Dept: FAMILY MEDICINE | Facility: CLINIC | Age: 75
End: 2025-06-10
Payer: COMMERCIAL

## 2025-06-30 ENCOUNTER — TRANSFERRED RECORDS (OUTPATIENT)
Dept: HEALTH INFORMATION MANAGEMENT | Facility: CLINIC | Age: 75
End: 2025-06-30
Payer: COMMERCIAL

## 2025-06-30 LAB — RETINOPATHY: NEGATIVE

## 2025-08-25 ENCOUNTER — TRANSFERRED RECORDS (OUTPATIENT)
Dept: HEALTH INFORMATION MANAGEMENT | Facility: CLINIC | Age: 75
End: 2025-08-25
Payer: COMMERCIAL

## 2025-08-25 LAB — RETINOPATHY: NEGATIVE
